# Patient Record
Sex: FEMALE | Race: WHITE | NOT HISPANIC OR LATINO | Employment: UNEMPLOYED | URBAN - METROPOLITAN AREA
[De-identification: names, ages, dates, MRNs, and addresses within clinical notes are randomized per-mention and may not be internally consistent; named-entity substitution may affect disease eponyms.]

---

## 2022-07-25 ENCOUNTER — OFFICE VISIT (OUTPATIENT)
Dept: NEUROSURGERY | Facility: CLINIC | Age: 52
End: 2022-07-25
Payer: COMMERCIAL

## 2022-07-25 VITALS
BODY MASS INDEX: 32.95 KG/M2 | DIASTOLIC BLOOD PRESSURE: 83 MMHG | RESPIRATION RATE: 18 BRPM | HEIGHT: 66 IN | WEIGHT: 205 LBS | HEART RATE: 63 BPM | SYSTOLIC BLOOD PRESSURE: 135 MMHG

## 2022-07-25 DIAGNOSIS — M48.02 CERVICAL SPINAL STENOSIS: Primary | ICD-10-CM

## 2022-07-25 PROCEDURE — 99204 OFFICE O/P NEW MOD 45 MIN: CPT | Mod: S$GLB,,, | Performed by: PHYSICIAN ASSISTANT

## 2022-07-25 PROCEDURE — 3008F BODY MASS INDEX DOCD: CPT | Mod: CPTII,S$GLB,, | Performed by: PHYSICIAN ASSISTANT

## 2022-07-25 PROCEDURE — 3079F PR MOST RECENT DIASTOLIC BLOOD PRESSURE 80-89 MM HG: ICD-10-PCS | Mod: CPTII,S$GLB,, | Performed by: PHYSICIAN ASSISTANT

## 2022-07-25 PROCEDURE — 1159F MED LIST DOCD IN RCRD: CPT | Mod: CPTII,S$GLB,, | Performed by: PHYSICIAN ASSISTANT

## 2022-07-25 PROCEDURE — 3075F PR MOST RECENT SYSTOLIC BLOOD PRESS GE 130-139MM HG: ICD-10-PCS | Mod: CPTII,S$GLB,, | Performed by: PHYSICIAN ASSISTANT

## 2022-07-25 PROCEDURE — 99204 PR OFFICE/OUTPT VISIT, NEW, LEVL IV, 45-59 MIN: ICD-10-PCS | Mod: S$GLB,,, | Performed by: PHYSICIAN ASSISTANT

## 2022-07-25 PROCEDURE — 3079F DIAST BP 80-89 MM HG: CPT | Mod: CPTII,S$GLB,, | Performed by: PHYSICIAN ASSISTANT

## 2022-07-25 PROCEDURE — 1159F PR MEDICATION LIST DOCUMENTED IN MEDICAL RECORD: ICD-10-PCS | Mod: CPTII,S$GLB,, | Performed by: PHYSICIAN ASSISTANT

## 2022-07-25 PROCEDURE — 3075F SYST BP GE 130 - 139MM HG: CPT | Mod: CPTII,S$GLB,, | Performed by: PHYSICIAN ASSISTANT

## 2022-07-25 PROCEDURE — 3008F PR BODY MASS INDEX (BMI) DOCUMENTED: ICD-10-PCS | Mod: CPTII,S$GLB,, | Performed by: PHYSICIAN ASSISTANT

## 2022-07-25 RX ORDER — GABAPENTIN 600 MG/1
TABLET ORAL
COMMUNITY

## 2022-07-25 RX ORDER — ERGOCALCIFEROL 1.25 MG/1
CAPSULE ORAL
COMMUNITY

## 2022-07-25 RX ORDER — HYDROCHLOROTHIAZIDE 12.5 MG/1
CAPSULE ORAL
COMMUNITY

## 2022-07-25 RX ORDER — ATORVASTATIN CALCIUM 10 MG/1
TABLET, FILM COATED ORAL
COMMUNITY

## 2022-07-25 RX ORDER — ALBUTEROL SULFATE 0.83 MG/ML
SOLUTION RESPIRATORY (INHALATION)
COMMUNITY

## 2022-07-25 RX ORDER — ASPIRIN 81 MG/1
81 TABLET ORAL DAILY
COMMUNITY

## 2022-07-25 RX ORDER — FLUTICASONE FUROATE AND VILANTEROL 200; 25 UG/1; UG/1
POWDER RESPIRATORY (INHALATION)
COMMUNITY

## 2022-07-25 RX ORDER — FAMOTIDINE 40 MG/1
TABLET, FILM COATED ORAL
COMMUNITY

## 2022-07-25 RX ORDER — BUPROPION HYDROCHLORIDE 150 MG/1
TABLET, EXTENDED RELEASE ORAL
COMMUNITY

## 2022-07-25 RX ORDER — MONTELUKAST SODIUM 10 MG/1
TABLET ORAL
COMMUNITY

## 2022-07-25 RX ORDER — PANTOPRAZOLE SODIUM 40 MG/1
40 TABLET, DELAYED RELEASE ORAL DAILY PRN
COMMUNITY

## 2022-07-25 RX ORDER — ONDANSETRON HYDROCHLORIDE 8 MG/1
TABLET, FILM COATED ORAL
COMMUNITY

## 2022-07-25 RX ORDER — ALPRAZOLAM 1 MG/1
TABLET ORAL
COMMUNITY

## 2022-07-25 RX ORDER — CARISOPRODOL 350 MG/1
TABLET ORAL
COMMUNITY
End: 2023-05-12

## 2022-07-25 RX ORDER — BUSPIRONE HYDROCHLORIDE 5 MG/1
TABLET ORAL
COMMUNITY

## 2022-07-25 RX ORDER — DICLOFENAC SODIUM 10 MG/G
GEL TOPICAL
COMMUNITY

## 2022-07-25 RX ORDER — NEBIVOLOL 5 MG/1
TABLET ORAL
COMMUNITY

## 2022-07-25 RX ORDER — SEMAGLUTIDE 1.34 MG/ML
1 INJECTION, SOLUTION SUBCUTANEOUS
Status: ON HOLD | COMMUNITY
Start: 2022-06-29 | End: 2023-04-06 | Stop reason: HOSPADM

## 2022-07-25 NOTE — PROGRESS NOTES
Neurosurgery History & Physical    Patient ID: Li Singh is a 51 y.o. female.    Chief Complaint   Patient presents with    Neck Pain     Patient presents to clinic with c/o neck pain that radiates into the upper back.  Patient states the first 3 fingers of the R hand are numb and the thumb of the L hand is numb.  Having headaches about 4-5 x per week.        Review of Systems   Constitutional: Negative for chills, diaphoresis, fatigue and fever.   HENT: Negative for congestion, ear pain, rhinorrhea, sneezing, sore throat and tinnitus.    Eyes: Negative for photophobia, pain, redness and visual disturbance.   Respiratory: Negative for cough, chest tightness, shortness of breath and wheezing.    Cardiovascular: Negative for chest pain, palpitations and leg swelling.   Gastrointestinal: Negative for abdominal distention, abdominal pain, constipation, diarrhea, nausea and vomiting.   Genitourinary: Negative for difficulty urinating, dysuria, frequency and urgency.   Musculoskeletal: Positive for neck pain. Negative for back pain, gait problem and myalgias.   Skin: Negative for pallor and rash.   Neurological: Positive for numbness. Negative for dizziness, seizures, speech difficulty, weakness and headaches.   Psychiatric/Behavioral: Negative for confusion and hallucinations.       History reviewed. No pertinent past medical history.  Social History     Socioeconomic History    Marital status:      Family History   Problem Relation Age of Onset    Breast cancer Mother 62     Review of patient's allergies indicates:  No Known Allergies    Current Outpatient Medications:     albuterol (PROVENTIL) 2.5 mg /3 mL (0.083 %) nebulizer solution, albuterol sulfate 2.5 mg/3 mL (0.083 %) solution for nebulization  INHALE 1 VIAL USING NEBULIZER EVERY 6 HOURS AS NEEDED FOR WHEEZING *THANK YOU* *TAVO MACK*, Disp: , Rfl:     ALPRAZolam (XANAX) 1 MG tablet, alprazolam 1 mg tablet  TAKE ONE TABLET BY MOUTH ONCE DAILY AS  NEEDED, Disp: , Rfl:     aspirin (ECOTRIN) 81 MG EC tablet, Take 81 mg by mouth once daily., Disp: , Rfl:     atorvastatin (LIPITOR) 10 MG tablet, atorvastatin 10 mg tablet  TAKE ONE TABLET BY MOUTH ONCE DAILY., Disp: , Rfl:     buPROPion (WELLBUTRIN SR) 150 MG TBSR 12 hr tablet, bupropion HCl  mg tablet,12 hr sustained-release  TAKE 1 TABLET BY MOUTH THREE TIMES DAILY *THANK YOU* *GOD RAVISS*, Disp: , Rfl:     busPIRone (BUSPAR) 5 MG Tab, buspirone 5 mg tablet  TAKE 1 TABLET BY MOUTH THREE TIMES DAILY AS NEEDED FOR ANXIETY, Disp: , Rfl:     carisoprodoL (SOMA) 350 MG tablet, carisoprodol 350 mg tablet  TAKE ONE TABLET BY MOUTH TWICE DAILY AS NEEDED FOR MUSCLE SPASMS, Disp: , Rfl:     diclofenac sodium (VOLTAREN) 1 % Gel, diclofenac 1 % topical gel  APPLY TO AFFECTED AREA TOPICALLY FOUR TIMES DAILY AS NEEDED THANK YOU GOD MARTINA, Disp: , Rfl:     ergocalciferol (ERGOCALCIFEROL) 50,000 unit Cap, Vitamin D2 1,250 mcg (50,000 unit) capsule  TAKE 1 CAPSULE BY MOUTH EVERY 7 DAYS *THANK YOU* *GOD RAVISS*, Disp: , Rfl:     famotidine (PEPCID) 40 MG tablet, famotidine 40 mg tablet, Disp: , Rfl:     fluticasone furoate-vilanteroL (BREO) 200-25 mcg/dose DsDv diskus inhaler, Breo Ellipta 200 mcg-25 mcg/dose powder for inhalation  inhale ONE PUFF BY MOUTH daily FOR breathing/asthma THANK YOU TAVO MACK, Disp: , Rfl:     gabapentin (NEURONTIN) 600 MG tablet, gabapentin 600 mg tablet  TAKE 1 TABLET BY MOUTH 3 TIMES DAILY, Disp: , Rfl:     hydroCHLOROthiazide (MICROZIDE) 12.5 mg capsule, hydrochlorothiazide 12.5 mg capsule  TAKE 1 CAPSULE BY MOUTH DAILY THANK YOU TAVO MACK, Disp: , Rfl:     montelukast (SINGULAIR) 10 mg tablet, montelukast 10 mg tablet  TAKE 1 TABLET BY MOUTH EVERY EVENING *THANK YOU* *GOD RAVISS*, Disp: , Rfl:     nebivoloL (BYSTOLIC) 5 MG Tab, nebivolol 5 mg tablet  TAKE 1 TABLET BY MOUTH DAILY FOR BLOOD PRESSURE *THANK YOU* *TAVO MACK*, Disp: , Rfl:     ondansetron (ZOFRAN) 8 MG tablet,  "ondansetron HCl 8 mg tablet  TAKE 1 TABLET BY MOUTH EVERY 8 HOURS AS NEEDED, Disp: , Rfl:     OZEMPIC 1 mg/dose (4 mg/3 mL), Inject 1 mg into the skin every 7 days., Disp: , Rfl:     pantoprazole (PROTONIX) 40 MG tablet, pantoprazole 40 mg tablet,delayed release  TAKE 1 TABLET BY MOUTH DAILY *THANK YOU* *TAVO MACK*, Disp: , Rfl:   Blood pressure 135/83, pulse 63, resp. rate 18, height 5' 6" (1.676 m), weight 93 kg (205 lb).      Neurologic Exam     Mental Status   Oriented to person, place, and time.   Oriented to person.   Oriented to place.   Oriented to time.   Follows 3 step commands.   Attention: normal. Concentration: normal.   Speech: speech is normal   Level of consciousness: alert  Knowledge: consistent with education.   Able to name object. Able to read. Able to repeat. Able to write. Normal comprehension.      Cranial Nerves      CN II   Visual acuity: normal  Right visual field deficit: none  Left visual field deficit: none      CN III, IV, VI   Pupils are equal, round, and reactive to light.  Right pupil: Size: 3 mm. Shape: regular. Reactivity: brisk. Consensual response: intact.   Left pupil: Size: 3 mm. Shape: regular. Reactivity: brisk. Consensual response: intact.   CN III: no CN III palsy  CN VI: no CN VI palsy  Nystagmus: none   Diplopia: none  Ophthalmoparesis: none  Conjugate gaze: present     CN V   Right facial sensation deficit: none  Left facial sensation deficit: none     CN VII   Right facial weakness: none  Left facial weakness: none     CN VIII   Hearing: intact     CN IX, X   CN IX normal.   CN X normal.      CN XI   Right sternocleidomastoid strength: normal  Left sternocleidomastoid strength: normal  Right trapezius strength: normal  Left trapezius strength: normal     CN XII   Fasciculations: absent  Tongue deviation: none     Motor Exam   Muscle bulk: normal  Overall muscle tone: normal  Right arm pronator drift: absent  Left arm pronator drift: absent     Strength   Right " deltoid: 5/5  Left deltoid: 5/5  Right biceps: 5/5  Left biceps: 5/5  Right triceps: 5/5  Left triceps: 5/5  Right wrist flexion: 5/5  Left wrist flexion: 5/5  Right wrist extension: 5/5  Left wrist extension: 5/5  Right interossei: 5/5  Left interossei: 5/5  Right iliopsoas: 5/5  Left iliopsoas: 5/5  Right quadriceps: 5/5  Left quadriceps: 5/5  Right hamstrin/5  Left hamstrin/5  Right anterior tibial: 5/5  Left anterior tibial: 5/5  Right posterior tibial: 5/5  Left posterior tibial: 5/5  Right peroneal: 5/5  Left peroneal: 5/5  Right gastroc: 5/5  Left gastroc: 5/5     Sensory Exam   Right arm light touch: normal  Left arm light touch: normal  Right leg light touch: normal  Left leg light touch: normal     Gait, Coordination, and Reflexes      Gait  Gait: normal      Coordination   Romberg: negative  Finger to nose coordination: normal  Heel to shin coordination: normal  Tandem walking coordination: normal     Tremor   Resting tremor: absent  Intention tremor: absent  Action tremor: absent     Reflexes   Right brachioradialis: 2+  Left brachioradialis: 2+  Right biceps: 2+  Left biceps: 2+  Right triceps: 2+  Left triceps: 2+  Right patellar: 2+  Left patellar: 2+  Right achilles: 1+  Left achilles: 1+  Right Lucio: present  Left Lucio: present  Right ankle clonus: absent  Left ankle clonus: absent  Right plantar: normal  Left plantar: normal    Physical Exam  Constitutional: Oriented to person, place, and time. Appears well-developed and well-nourished.   HENT:   Head: Normocephalic and atraumatic.   Eyes: Pupils are equal, round, and reactive to light.   Neck: Normal range of motion. Neck supple.   Cardiovascular: Normal rate.    Pulmonary/Chest: Effort normal.   Musculoskeletal: Normal range of motion. Exhibits no edema.   Neurological: Alert and oriented to person, place, and time. Normal Finger-Nose-Finger Test, a normal Heel to Shin Test, a normal Romberg Test and a normal Tandem Gait Test. Gait  normal.   Reflex Scores:       Tricep reflexes are 2+ on the right side and 2+ on the left side.       Bicep reflexes are 2+ on the right side and 2+ on the left side.       Brachioradialis reflexes are 2+ on the right side and 2+ on the left side.       Patellar reflexes are 2+ on the right side and 2+ on the left side.       Achilles reflexes are 1+ on the right side and 1+ on the left side.  Skin: Skin is warm, dry and intact.   Psychiatric: Normal mood and affect. Speech is normal and behavior is normal. Judgment and thought content normal.   Nursing note and vitals reviewed.    Provider dictation:  No updated imaging.     The patient is a 51 year old female who presents for neurosurgical evaluation. She reports a history of chronic neck pain for the past 10 years that has progressively worsened. The pain has become more severe over the past 2 years. It will radiate into the bilateral shoulder blades. Denies radiating upper extremity pain. Reports intermittent numbness in mainly the 1st-3rd digits of the right > left hand that occurs at night usually when her neck is positioned in right or left sided flexion. She underwent injections in her cervical spine and participated in physical therapy about 4 years ago with no relief. She has been seeing a chiropractor who she last saw about 3 months ago with short term relief of her symptoms. She has been evaluated by Dr. Al about 4 years ago who was recommending surgery at that time according to the patient. Denies upper extremity weakness or dropping objects from her hands. Denies difficulty with fine motor movements or balance difficulty.     On exam the patient has full strength. There is + bilateral villalba signs.     I will obtain an MRI of the Cervical spine to assess for compressive pathology. I will call the patient with the results and further plan.     1. Cervical spinal stenosis  MRI Cervical Spine Without Contrast

## 2022-07-27 ENCOUNTER — HOSPITAL ENCOUNTER (OUTPATIENT)
Dept: RADIOLOGY | Facility: HOSPITAL | Age: 52
Discharge: HOME | End: 2022-07-27
Attending: PHYSICIAN ASSISTANT
Payer: COMMERCIAL

## 2022-07-27 DIAGNOSIS — M48.02 CERVICAL SPINAL STENOSIS: ICD-10-CM

## 2022-07-27 PROCEDURE — 72141 MRI NECK SPINE W/O DYE: CPT | Mod: TC,PO

## 2022-07-27 PROCEDURE — 72141 MRI CERVICAL SPINE WITHOUT CONTRAST: ICD-10-PCS | Mod: 26,,, | Performed by: RADIOLOGY

## 2022-07-27 PROCEDURE — 72141 MRI NECK SPINE W/O DYE: CPT | Mod: 26,,, | Performed by: RADIOLOGY

## 2022-08-02 ENCOUNTER — TELEPHONE (OUTPATIENT)
Dept: NEUROSURGERY | Facility: CLINIC | Age: 52
End: 2022-08-02
Payer: COMMERCIAL

## 2022-08-02 DIAGNOSIS — M47.812 CERVICAL SPONDYLOSIS: Primary | ICD-10-CM

## 2022-08-02 NOTE — TELEPHONE ENCOUNTER
Called patient and reviewed results of MRI. Recommend PT and Pain management referral. Order placed for Pain management. Patient will call with name of facility she would like PT orders sent to. FU PRN.       ----- Message from Shelby Segura LPN sent at 7/25/2022  2:11 PM CDT -----  Please call with results of MRI

## 2022-08-02 NOTE — TELEPHONE ENCOUNTER
----- Message from Shelby Segura LPN sent at 7/25/2022  2:11 PM CDT -----  Please call with results of MRI

## 2022-08-04 ENCOUNTER — TELEPHONE (OUTPATIENT)
Dept: PAIN MEDICINE | Facility: CLINIC | Age: 52
End: 2022-08-04
Payer: COMMERCIAL

## 2022-08-04 NOTE — TELEPHONE ENCOUNTER
Call placed to Pt to assist with scheduling appt from referral by ERROL Horton.. Appt scheduled for 9-7-22.

## 2022-08-04 NOTE — TELEPHONE ENCOUNTER
----- Message from Miles Brooks MA sent at 8/3/2022  8:00 AM CDT -----  Good Morning,      ERROL Steen has put in a referral for the current patient, the patient is actually closer to you then here in [place].    Thank you,    Miles

## 2022-08-31 ENCOUNTER — CLINICAL SUPPORT (OUTPATIENT)
Dept: OPHTHALMOLOGY | Facility: CLINIC | Age: 52
End: 2022-08-31
Payer: COMMERCIAL

## 2022-08-31 ENCOUNTER — OFFICE VISIT (OUTPATIENT)
Dept: OPHTHALMOLOGY | Facility: CLINIC | Age: 52
End: 2022-08-31
Payer: COMMERCIAL

## 2022-08-31 DIAGNOSIS — H47.333 PSEUDOPAPILLEDEMA OF BOTH OPTIC DISCS: ICD-10-CM

## 2022-08-31 DIAGNOSIS — H53.40 VISUAL FIELD DEFECT: Primary | ICD-10-CM

## 2022-08-31 DIAGNOSIS — H47.11 PAPILLEDEMA ASSOCIATED WITH INCREASED INTRACRANIAL PRESSURE: Primary | ICD-10-CM

## 2022-08-31 DIAGNOSIS — H47.333 CROWDED OPTIC DISC, BILATERAL: ICD-10-CM

## 2022-08-31 PROCEDURE — 99999 PR PBB SHADOW E&M-EST. PATIENT-LVL III: ICD-10-PCS | Mod: PBBFAC,,, | Performed by: OPHTHALMOLOGY

## 2022-08-31 PROCEDURE — 92133 CPTRZD OPH DX IMG PST SGM ON: CPT | Mod: S$GLB,,, | Performed by: OPHTHALMOLOGY

## 2022-08-31 PROCEDURE — 1159F MED LIST DOCD IN RCRD: CPT | Mod: CPTII,S$GLB,, | Performed by: OPHTHALMOLOGY

## 2022-08-31 PROCEDURE — 92133 POSTERIOR SEGMENT OCT OPTIC NERVE(OCULAR COHERENCE TOMOGRAPHY) - OU - BOTH EYES: ICD-10-PCS | Mod: S$GLB,,, | Performed by: OPHTHALMOLOGY

## 2022-08-31 PROCEDURE — 99203 OFFICE O/P NEW LOW 30 MIN: CPT | Mod: S$GLB,,, | Performed by: OPHTHALMOLOGY

## 2022-08-31 PROCEDURE — 99203 PR OFFICE/OUTPT VISIT, NEW, LEVL III, 30-44 MIN: ICD-10-PCS | Mod: S$GLB,,, | Performed by: OPHTHALMOLOGY

## 2022-08-31 PROCEDURE — 92083 EXTENDED VISUAL FIELD XM: CPT | Mod: S$GLB,,, | Performed by: OPHTHALMOLOGY

## 2022-08-31 PROCEDURE — 99999 PR PBB SHADOW E&M-EST. PATIENT-LVL III: CPT | Mod: PBBFAC,,, | Performed by: OPHTHALMOLOGY

## 2022-08-31 PROCEDURE — 1159F PR MEDICATION LIST DOCUMENTED IN MEDICAL RECORD: ICD-10-PCS | Mod: CPTII,S$GLB,, | Performed by: OPHTHALMOLOGY

## 2022-08-31 PROCEDURE — 92083 HUMPHREY VISUAL FIELD - OU - BOTH EYES: ICD-10-PCS | Mod: S$GLB,,, | Performed by: OPHTHALMOLOGY

## 2022-08-31 NOTE — LETTER
[facility] - [address]  [address]  [facility] [address]  Phone: [phone]  Fax: [phone]   August 31, 2022    Robert Mcdaniel, OD  [address] [facility] [place] [address]    Patient: Li Singh   MR Number: [id number]   YOB: 1970   Date of Visit: 8/31/2022       Dear Dr. Mcdaniel:    Thank you for referring Li Singh to me for evaluation. Here is my assessment and plan of care:    Assessment/Plan    For exam results, see Encounter Report.    Pseudopapilledema of both optic discs  -     Kamara Visual Field - OU - Extended - Both Eyes    Crowded optic disc, bilateral  -     Posterior Segment OCT Optic Nerve- Both eyes      I found no evidence of optic disc edema in either. She has a congenital anomaly. No further diagnostic testing is indicated. Return to me as needed.          Below you will find my full exam findings. If you have questions, please do not hesitate to call me. I look forward to following Ms. Li Singh along with you.    Sincerely,          Marco Rendon MD       CC  No Recipients             Base Eye Exam       Visual Acuity (Snellen - Linear)         Right Left    Dist cc 20/20 20/20      Correction: Glasses              Tonometry (Applanation, 3:42 PM)         Right Left    Pressure 17 15              Pupils         Dark Light Shape React APD    Right 4 2 Round Brisk None    Left 4 2 Round Brisk None              Visual Fields    See HVF report             Extraocular Movement         Right Left     Full, Ortho Full, Ortho              Neuro/Psych       Oriented x3: Yes    Mood/Affect: Normal              Dilation       Both eyes: 1% Mydriacyl, 2.5% Phenylephrine @ 3:44 PM                  Slit Lamp and Fundus Exam       External Exam         Right Left    External Normal Normal              Slit Lamp Exam         Right Left    Lids/Lashes Normal Normal    Conjunctiva/Sclera White and quiet White and quiet    Cornea Clear Clear    Anterior  Chamber Deep and quiet Deep and quiet    Iris Round and reactive Round and reactive    Lens Clear Clear    Vitreous Normal Normal              Fundus Exam         Right Left    Disc Crowded, no edema Crowded, no edema    C/D Ratio 0.1 0.1    Macula Normal Normal    Vessels Normal Normal    Periphery Normal Normal

## 2022-08-31 NOTE — PROGRESS NOTES
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  Headaches regularly.(Temple and eye area)  Vision stable w/correction, but does notice haze on occasion.  No eye drops.    Review HVF AND OCT(RNFL)    I have personally interviewed the patient, reviewed the history and   examined the patient and agree with the technician's exam.   Last edited by Marco Rendon MD on 8/31/2022  3:28 PM.            Assessment /Plan     For exam results, see Encounter Report.    Pseudopapilledema of both optic discs  -     Kamara Visual Field - OU - Extended - Both Eyes    Crowded optic disc, bilateral  -     Posterior Segment OCT Optic Nerve- Both eyes      I found no evidence of optic disc edema in either. She has a congenital anomaly. No further diagnostic testing is indicated. Return to me as needed.

## 2022-09-07 ENCOUNTER — TELEPHONE (OUTPATIENT)
Dept: PAIN MEDICINE | Facility: CLINIC | Age: 52
End: 2022-09-07
Payer: COMMERCIAL

## 2022-09-07 ENCOUNTER — OFFICE VISIT (OUTPATIENT)
Dept: PAIN MEDICINE | Facility: CLINIC | Age: 52
End: 2022-09-07
Payer: COMMERCIAL

## 2022-09-07 VITALS
SYSTOLIC BLOOD PRESSURE: 128 MMHG | OXYGEN SATURATION: 100 % | DIASTOLIC BLOOD PRESSURE: 68 MMHG | HEIGHT: 66 IN | HEART RATE: 72 BPM | WEIGHT: 204.25 LBS | BODY MASS INDEX: 32.83 KG/M2

## 2022-09-07 DIAGNOSIS — M54.12 CERVICAL RADICULOPATHY: Primary | ICD-10-CM

## 2022-09-07 DIAGNOSIS — M47.812 CERVICAL SPONDYLOSIS: ICD-10-CM

## 2022-09-07 PROCEDURE — 3074F PR MOST RECENT SYSTOLIC BLOOD PRESSURE < 130 MM HG: ICD-10-PCS | Mod: CPTII,S$GLB,, | Performed by: ANESTHESIOLOGY

## 2022-09-07 PROCEDURE — 99999 PR PBB SHADOW E&M-EST. PATIENT-LVL V: ICD-10-PCS | Mod: PBBFAC,,, | Performed by: ANESTHESIOLOGY

## 2022-09-07 PROCEDURE — 99999 PR PBB SHADOW E&M-EST. PATIENT-LVL V: CPT | Mod: PBBFAC,,, | Performed by: ANESTHESIOLOGY

## 2022-09-07 PROCEDURE — 99204 OFFICE O/P NEW MOD 45 MIN: CPT | Mod: S$GLB,,, | Performed by: ANESTHESIOLOGY

## 2022-09-07 PROCEDURE — 3008F PR BODY MASS INDEX (BMI) DOCUMENTED: ICD-10-PCS | Mod: CPTII,S$GLB,, | Performed by: ANESTHESIOLOGY

## 2022-09-07 PROCEDURE — 99204 PR OFFICE/OUTPT VISIT, NEW, LEVL IV, 45-59 MIN: ICD-10-PCS | Mod: S$GLB,,, | Performed by: ANESTHESIOLOGY

## 2022-09-07 PROCEDURE — 3074F SYST BP LT 130 MM HG: CPT | Mod: CPTII,S$GLB,, | Performed by: ANESTHESIOLOGY

## 2022-09-07 PROCEDURE — 3008F BODY MASS INDEX DOCD: CPT | Mod: CPTII,S$GLB,, | Performed by: ANESTHESIOLOGY

## 2022-09-07 PROCEDURE — 1159F PR MEDICATION LIST DOCUMENTED IN MEDICAL RECORD: ICD-10-PCS | Mod: CPTII,S$GLB,, | Performed by: ANESTHESIOLOGY

## 2022-09-07 PROCEDURE — 3078F DIAST BP <80 MM HG: CPT | Mod: CPTII,S$GLB,, | Performed by: ANESTHESIOLOGY

## 2022-09-07 PROCEDURE — 3078F PR MOST RECENT DIASTOLIC BLOOD PRESSURE < 80 MM HG: ICD-10-PCS | Mod: CPTII,S$GLB,, | Performed by: ANESTHESIOLOGY

## 2022-09-07 PROCEDURE — 1159F MED LIST DOCD IN RCRD: CPT | Mod: CPTII,S$GLB,, | Performed by: ANESTHESIOLOGY

## 2022-09-07 RX ORDER — SODIUM CHLORIDE, SODIUM LACTATE, POTASSIUM CHLORIDE, CALCIUM CHLORIDE 600; 310; 30; 20 MG/100ML; MG/100ML; MG/100ML; MG/100ML
INJECTION, SOLUTION INTRAVENOUS CONTINUOUS
Status: CANCELLED | OUTPATIENT
Start: 2022-09-07

## 2022-09-07 RX ORDER — TRAZODONE HYDROCHLORIDE 50 MG/1
TABLET ORAL
COMMUNITY
End: 2022-09-21

## 2022-09-07 NOTE — TELEPHONE ENCOUNTER
This patient is scheduled to have a cervical epidural steroid injection on 9/22 and will need to hold her aspirin for 5 days prior.

## 2022-09-07 NOTE — H&P (VIEW-ONLY)
This note was completed with dictation software and grammatical errors may exist.    Chief Complaint   Patient presents with    Neck Pain        HPI: Li Singh is a 51 y.o. year old female patient who has a past medical history of Anxiety, Gastric ulcer, Hyperlipidemia, and Hypertension. She presents in referral from Shikha Steen for neck pain.  The patient reports that she has had neck pain for about the last 10 years, denies any specific trauma that may have caused this.  Is located in her bilateral neck at the base of her neck out into the bilateral  numbness and tingling in the right arm especially when tilting her head to the left and gets numbness ,  and tingling in the 1st through 3rd fingers.  The pain also radiates into the trapezius sometimes into the clavicle and down the middle of her spine through the shoulder blades.   She reports that at time she gets headaches in the occipital region that advanced into the front of her head at times as well.   She denies any balance issues, denies any regan weakness.    Pain intervention history:  She apparently has undergone injections in her neck with no relief.    Spine surgeries:  The patient states that she had seen Dr. Al in the past who had recommended surgery    Antineuropathics:  Gabapentin 600 3 times daily  NSAIDs:  Physical therapy:  She is done physical therapy in the past, chiropractic care with some benefit.  She states that she was seeing a chiropractor at least once a week but was not able to do this for 3 months and states that her pain greatly worsened during that time.  Antidepressants:  BuSpar 5 mg, Wellbutrin 150 mg  Muscle relaxers:  Xanax 1 mg, Soma  Opioids:  Antiplatelets/Anticoagulants:  Aspirin 81        ROS:  She reports headaches, stomach ulcer, easy bruising, joint stiffness, back pain, memory loss, dizziness, difficulty sleeping.  Balance of review of systems negative.    No results found for: LABA1C, HGBA1C    No results  "found for: WBC, HGB, HCT, MCV, PLT          Past Medical History:   Diagnosis Date    Anxiety     Gastric ulcer     Hyperlipidemia     Hypertension        History reviewed. No pertinent surgical history.    Social History     Socioeconomic History    Marital status:          Medications/Allergies: See med card    Vitals:    22 0926   BP: 128/68   Pulse: 72   SpO2: 100%   Weight: 92.7 kg (204 lb 4.1 oz)   Height: 5' 6" (1.676 m)   PainSc:   8   PainLoc: Neck     Body mass index is 32.97 kg/m².    Physical exam:  Gen: A and O x3, pleasant, well-groomed  Skin: No rashes or obvious lesions  HEENT: PERRLA, no obvious deformities on ears or in canals.Trachea midline.  CVS: Regular rate and rhythm, normal palpable pulses.  Resp: Clear to auscultation bilaterally, no wheezes or rales.  Abdomen: Soft, NT/ND.  Musculoskeletal: No antalgic gait.       Neuro:  Motor:    Right Left   C4 Shoulder Abduction  5  5   C5 Elbow Flexion    5  5   C6 Wrist Extension  5  5   C7 Elbow Extension   5  5   C8/T1 Hand Intrinsics   5  5   C8 First Dorsal Interosseus  5  5   C8 Abductor Pollicus Brevis  5  5      Left  Right    Triceps DTR 2+ 2+   Biceps DTR 2+ 2+   Brachioradialis DTR 2+ 2+   Patellar DTR 1 1+   Achilles DTR 1+ 1+   Lucio Absent  Absent   Clonus Absent Absent            Sensory: Intact and symmetrical to light touch and pinprick in C2-T1 dermatomes bilaterally.  Cervical spine: ROM is full in flexion, extension and lateral rotation with increased pain on extension greater than flexion, lateral rotation increased pain to the left greater than right   Spurling's maneuver causes neck pain to either side, left greater than right..  Myofascial exam: No Tenderness to palpation across cervical paraspinous region bilaterally.    Imagin22 MRI C-spine:  Alignment: Reversal of the normal cervical lordosis centered at C4.  Minimal anterolisthesis of C3 on C4, 2 mm.  Minimal retrolisthesis of C4 on C5 and C5 on C6, " 1-2 mm.   Vertebral Column: Vertebral body heights are maintained.  No evidence of an acute fracture or aggressive marrow replacement process. Probable small hemangiomas within the C4 C7 vertebral bodies.  Moderate disc degeneration at C4-5 and C5-6 with moderate intervertebral disc space narrowing, degenerative endplate change and marginal osteophyte formation.   Cord: Mild flattening of the left ventral aspect of the cord at C4-5.  No focal cord signal abnormality.   Skull base and craniocervical junction: Normal.   Degenerative findings:   C2-C3: The disc is normal in configuration. Mild left facet arthropathy.  There is no neural foraminal stenosis.  There is no spinal canal stenosis.   C3-C4: Mild posterior disc osteophyte complex, which mildly effaces the ventral thecal sac.  Mild left facet arthropathy.  Mild left neural foraminal stenosis.  There is no spinal canal stenosis.   C4-C5: Left asymmetric posterior disc osteophyte complex, which mildly effaces the ventral thecal sac and flattens the left ventral aspect of the cord without significant cord compression or focal cord signal abnormality.  Mild bilateral facet arthropathy.  Moderate left uncovertebral joint spurring.  Moderate left neural foraminal stenosis.  There is no spinal canal stenosis.   C5-C6: Posterior disc osteophyte complex.  Mild bilateral facet arthropathy.  Marked right and moderate left uncovertebral joint spurring.  Severe right and moderate left neural foraminal stenosis.  Ligamentum flavum thickening.  Mild spinal canal stenosis.   C6-C7: Mild posterior disc osteophyte complex.  Mild bilateral facet arthropathy.  Mild left uncovertebral joint spurring.  Mild left neural foraminal stenosis.  There is no spinal canal stenosis.   C7-T1: The disc is normal in configuration.  Mild bilateral facet arthropathy and uncovertebral joint spurring.  Mild bilateral neural foraminal stenosis.  There is no spinal canal stenosis.    Assessment:    Li Singh is a 51 y.o. year old female patient who has a past medical history of Anxiety, Gastric ulcer, Hyperlipidemia, and Hypertension. She presents in referral from Shikha Steen for neck pain.     1. Cervical radiculopathy  Vital signs    Place 18-22 [facility] IV     Verify informed consent    Notify physician     Notify physician     Notify physician (specify)    Diet NPO    Case Request Operating Room: Injection-steroid-epidural-cervical    Place in Outpatient    lactated ringers infusion      2. Cervical spondylosis            Plan:  1. We discussed her symptoms, reviewed her cervical spine MRI which shows reversal of cervical lordosis with degenerative changes at C4/5 and C5/6.  There is not necessarily compression of the cord but there is definitely indentation towards the left side into the thecal sac at C4/5 and some foraminal narrowing out to the right side at C5/6 which may account for her right arm and hand numbness and tingling.  We discussed that she has some facet arthropathy at multiple levels throughout her cervical spine which could also contribute to the neck pain but also her headaches.  2. We discussed interventional procedures that may help, I suggested we start with an epidural steroid injection to see if this helps with both the neck and arm symptoms.  I will have her follow up in several weeks afterwards.  If she is having relief, we can continue treatments as needed.  If she does not have relief with the epidural steroid injection I would consider medial branch blocks.  3.  She asked about what type of medication she could take, I told her to continue taking Tylenol, she reports taking Soma from her primary care doctor in the past but states that she takes this rarely and usually mixes it with a Percocet when she does, this is 1 of her family members medications.  Nonetheless she does not sound like she is seeking this type of medication, I told her to be careful with  this as it can cause addiction and overdose.  She reports having a sister who had substance abuse problems.    Thank you for referring this interesting patient, and I look forward to continuing to collaborate in her care.

## 2022-09-07 NOTE — PROGRESS NOTES
This note was completed with dictation software and grammatical errors may exist.    Chief Complaint   Patient presents with    Neck Pain        HPI: Li Singh is a 51 y.o. year old female patient who has a past medical history of Anxiety, Gastric ulcer, Hyperlipidemia, and Hypertension. She presents in referral from Shikha Steen for neck pain.  The patient reports that she has had neck pain for about the last 10 years, denies any specific trauma that may have caused this.  Is located in her bilateral neck at the base of her neck out into the bilateral  numbness and tingling in the right arm especially when tilting her head to the left and gets numbness ,  and tingling in the 1st through 3rd fingers.  The pain also radiates into the trapezius sometimes into the clavicle and down the middle of her spine through the shoulder blades.   She reports that at time she gets headaches in the occipital region that advanced into the front of her head at times as well.   She denies any balance issues, denies any regan weakness.    Pain intervention history:  She apparently has undergone injections in her neck with no relief.    Spine surgeries:  The patient states that she had seen Dr. Al in the past who had recommended surgery    Antineuropathics:  Gabapentin 600 3 times daily  NSAIDs:  Physical therapy:  She is done physical therapy in the past, chiropractic care with some benefit.  She states that she was seeing a chiropractor at least once a week but was not able to do this for 3 months and states that her pain greatly worsened during that time.  Antidepressants:  BuSpar 5 mg, Wellbutrin 150 mg  Muscle relaxers:  Xanax 1 mg, Soma  Opioids:  Antiplatelets/Anticoagulants:  Aspirin 81        ROS:  She reports headaches, stomach ulcer, easy bruising, joint stiffness, back pain, memory loss, dizziness, difficulty sleeping.  Balance of review of systems negative.    No results found for: LABA1C, HGBA1C    No results  "found for: WBC, HGB, HCT, MCV, PLT          Past Medical History:   Diagnosis Date    Anxiety     Gastric ulcer     Hyperlipidemia     Hypertension        History reviewed. No pertinent surgical history.    Social History     Socioeconomic History    Marital status:          Medications/Allergies: See med card    Vitals:    22 0926   BP: 128/68   Pulse: 72   SpO2: 100%   Weight: 92.7 kg (204 lb 4.1 oz)   Height: 5' 6" (1.676 m)   PainSc:   8   PainLoc: Neck     Body mass index is 32.97 kg/m².    Physical exam:  Gen: A and O x3, pleasant, well-groomed  Skin: No rashes or obvious lesions  HEENT: PERRLA, no obvious deformities on ears or in canals.Trachea midline.  CVS: Regular rate and rhythm, normal palpable pulses.  Resp: Clear to auscultation bilaterally, no wheezes or rales.  Abdomen: Soft, NT/ND.  Musculoskeletal: No antalgic gait.       Neuro:  Motor:    Right Left   C4 Shoulder Abduction  5  5   C5 Elbow Flexion    5  5   C6 Wrist Extension  5  5   C7 Elbow Extension   5  5   C8/T1 Hand Intrinsics   5  5   C8 First Dorsal Interosseus  5  5   C8 Abductor Pollicus Brevis  5  5      Left  Right    Triceps DTR 2+ 2+   Biceps DTR 2+ 2+   Brachioradialis DTR 2+ 2+   Patellar DTR 1 1+   Achilles DTR 1+ 1+   Lucio Absent  Absent   Clonus Absent Absent            Sensory: Intact and symmetrical to light touch and pinprick in C2-T1 dermatomes bilaterally.  Cervical spine: ROM is full in flexion, extension and lateral rotation with increased pain on extension greater than flexion, lateral rotation increased pain to the left greater than right   Spurling's maneuver causes neck pain to either side, left greater than right..  Myofascial exam: No Tenderness to palpation across cervical paraspinous region bilaterally.    Imagin22 MRI C-spine:  Alignment: Reversal of the normal cervical lordosis centered at C4.  Minimal anterolisthesis of C3 on C4, 2 mm.  Minimal retrolisthesis of C4 on C5 and C5 on C6, " 1-2 mm.   Vertebral Column: Vertebral body heights are maintained.  No evidence of an acute fracture or aggressive marrow replacement process. Probable small hemangiomas within the C4 C7 vertebral bodies.  Moderate disc degeneration at C4-5 and C5-6 with moderate intervertebral disc space narrowing, degenerative endplate change and marginal osteophyte formation.   Cord: Mild flattening of the left ventral aspect of the cord at C4-5.  No focal cord signal abnormality.   Skull base and craniocervical junction: Normal.   Degenerative findings:   C2-C3: The disc is normal in configuration. Mild left facet arthropathy.  There is no neural foraminal stenosis.  There is no spinal canal stenosis.   C3-C4: Mild posterior disc osteophyte complex, which mildly effaces the ventral thecal sac.  Mild left facet arthropathy.  Mild left neural foraminal stenosis.  There is no spinal canal stenosis.   C4-C5: Left asymmetric posterior disc osteophyte complex, which mildly effaces the ventral thecal sac and flattens the left ventral aspect of the cord without significant cord compression or focal cord signal abnormality.  Mild bilateral facet arthropathy.  Moderate left uncovertebral joint spurring.  Moderate left neural foraminal stenosis.  There is no spinal canal stenosis.   C5-C6: Posterior disc osteophyte complex.  Mild bilateral facet arthropathy.  Marked right and moderate left uncovertebral joint spurring.  Severe right and moderate left neural foraminal stenosis.  Ligamentum flavum thickening.  Mild spinal canal stenosis.   C6-C7: Mild posterior disc osteophyte complex.  Mild bilateral facet arthropathy.  Mild left uncovertebral joint spurring.  Mild left neural foraminal stenosis.  There is no spinal canal stenosis.   C7-T1: The disc is normal in configuration.  Mild bilateral facet arthropathy and uncovertebral joint spurring.  Mild bilateral neural foraminal stenosis.  There is no spinal canal stenosis.    Assessment:    Li Singh is a 51 y.o. year old female patient who has a past medical history of Anxiety, Gastric ulcer, Hyperlipidemia, and Hypertension. She presents in referral from Shikha Steen for neck pain.     1. Cervical radiculopathy  Vital signs    Place 18-22 [facility] IV     Verify informed consent    Notify physician     Notify physician     Notify physician (specify)    Diet NPO    Case Request Operating Room: Injection-steroid-epidural-cervical    Place in Outpatient    lactated ringers infusion      2. Cervical spondylosis            Plan:  1. We discussed her symptoms, reviewed her cervical spine MRI which shows reversal of cervical lordosis with degenerative changes at C4/5 and C5/6.  There is not necessarily compression of the cord but there is definitely indentation towards the left side into the thecal sac at C4/5 and some foraminal narrowing out to the right side at C5/6 which may account for her right arm and hand numbness and tingling.  We discussed that she has some facet arthropathy at multiple levels throughout her cervical spine which could also contribute to the neck pain but also her headaches.  2. We discussed interventional procedures that may help, I suggested we start with an epidural steroid injection to see if this helps with both the neck and arm symptoms.  I will have her follow up in several weeks afterwards.  If she is having relief, we can continue treatments as needed.  If she does not have relief with the epidural steroid injection I would consider medial branch blocks.  3.  She asked about what type of medication she could take, I told her to continue taking Tylenol, she reports taking Soma from her primary care doctor in the past but states that she takes this rarely and usually mixes it with a Percocet when she does, this is 1 of her family members medications.  Nonetheless she does not sound like she is seeking this type of medication, I told her to be careful with  this as it can cause addiction and overdose.  She reports having a sister who had substance abuse problems.    Thank you for referring this interesting patient, and I look forward to continuing to collaborate in her care.

## 2022-09-20 ENCOUNTER — TELEPHONE (OUTPATIENT)
Dept: SURGERY | Facility: HOSPITAL | Age: 52
End: 2022-09-20
Payer: COMMERCIAL

## 2022-09-20 NOTE — TELEPHONE ENCOUNTER
Spoke with pt - she denies taking ASA yesterday. She states that her last dose was last week on either 9/15 or 9/16. She is scheduled for a MODESTA on 9/22. If she had taken her last dose of ASA on 9/16, she will be off x6 days for her procedure. Ok to continue or should she reschedule?

## 2022-09-20 NOTE — TELEPHONE ENCOUNTER
Hi, during the preop phone call, this patient reports that she forgot to hold her aspirin, stating that she was instructed to hold for 7 days and her last dose was 09/19/22. Her procedure with Dr. Bernabe was scheduled for 09/22/22. Please reach out to patient directly to advise, reschedule, cancel, etc.

## 2022-09-22 ENCOUNTER — HOSPITAL ENCOUNTER (OUTPATIENT)
Dept: RADIOLOGY | Facility: HOSPITAL | Age: 52
Discharge: HOME | End: 2022-09-22
Attending: ANESTHESIOLOGY | Admitting: ANESTHESIOLOGY
Payer: COMMERCIAL

## 2022-09-22 ENCOUNTER — HOSPITAL ENCOUNTER (OUTPATIENT)
Facility: HOSPITAL | Age: 52
Discharge: HOME | End: 2022-09-22
Attending: ANESTHESIOLOGY | Admitting: ANESTHESIOLOGY
Payer: COMMERCIAL

## 2022-09-22 DIAGNOSIS — M50.30 DDD (DEGENERATIVE DISC DISEASE), CERVICAL: ICD-10-CM

## 2022-09-22 DIAGNOSIS — M54.12 CERVICAL RADICULOPATHY: ICD-10-CM

## 2022-09-22 PROCEDURE — A4216 STERILE WATER/SALINE, 10 ML: HCPCS | Mod: PO | Performed by: ANESTHESIOLOGY

## 2022-09-22 PROCEDURE — 25000003 PHARM REV CODE 250: Mod: PO | Performed by: ANESTHESIOLOGY

## 2022-09-22 PROCEDURE — 62321 NJX INTERLAMINAR CRV/THRC: CPT | Mod: ,,, | Performed by: ANESTHESIOLOGY

## 2022-09-22 PROCEDURE — 25500020 PHARM REV CODE 255: Mod: PO | Performed by: ANESTHESIOLOGY

## 2022-09-22 PROCEDURE — 76000 FLUOROSCOPY <1 HR PHYS/QHP: CPT | Mod: TC,PO

## 2022-09-22 PROCEDURE — 62321 PR INJ CERV/THORAC, W/GUIDANCE: ICD-10-PCS | Mod: ,,, | Performed by: ANESTHESIOLOGY

## 2022-09-22 PROCEDURE — 62321 NJX INTERLAMINAR CRV/THRC: CPT | Mod: PO | Performed by: ANESTHESIOLOGY

## 2022-09-22 PROCEDURE — 63600175 PHARM REV CODE 636 W HCPCS: Mod: PO | Performed by: ANESTHESIOLOGY

## 2022-09-22 RX ORDER — SODIUM CHLORIDE 9 MG/ML
INJECTION, SOLUTION INTRAMUSCULAR; INTRAVENOUS; SUBCUTANEOUS
Status: DISCONTINUED | OUTPATIENT
Start: 2022-09-22 | End: 2022-09-22 | Stop reason: HOSPADM

## 2022-09-22 RX ORDER — LIDOCAINE HYDROCHLORIDE 10 MG/ML
INJECTION, SOLUTION EPIDURAL; INFILTRATION; INTRACAUDAL; PERINEURAL
Status: DISCONTINUED | OUTPATIENT
Start: 2022-09-22 | End: 2022-09-22 | Stop reason: HOSPADM

## 2022-09-22 RX ORDER — SODIUM CHLORIDE, SODIUM LACTATE, POTASSIUM CHLORIDE, CALCIUM CHLORIDE 600; 310; 30; 20 MG/100ML; MG/100ML; MG/100ML; MG/100ML
INJECTION, SOLUTION INTRAVENOUS CONTINUOUS
Status: DISCONTINUED | OUTPATIENT
Start: 2022-09-22 | End: 2022-09-22 | Stop reason: HOSPADM

## 2022-09-22 RX ORDER — METHYLPREDNISOLONE ACETATE 80 MG/ML
INJECTION, SUSPENSION INTRA-ARTICULAR; INTRALESIONAL; INTRAMUSCULAR; SOFT TISSUE
Status: DISCONTINUED | OUTPATIENT
Start: 2022-09-22 | End: 2022-09-22 | Stop reason: HOSPADM

## 2022-09-22 RX ORDER — MIDAZOLAM HYDROCHLORIDE 1 MG/ML
INJECTION INTRAMUSCULAR; INTRAVENOUS
Status: DISCONTINUED | OUTPATIENT
Start: 2022-09-22 | End: 2022-09-22 | Stop reason: HOSPADM

## 2022-09-22 RX ADMIN — SODIUM CHLORIDE, SODIUM LACTATE, POTASSIUM CHLORIDE, AND CALCIUM CHLORIDE: .6; .31; .03; .02 INJECTION, SOLUTION INTRAVENOUS at 03:09

## 2022-09-22 NOTE — OP NOTE
PROCEDURE DATE: 9/22/2022    Procedure: C7-T1 cervical interlaminar epidural steroid injection under utilizing fluoroscopy.    Diagnosis: Cervical Radiculopathy    POSTOP DIAGNOSIS: SAME    Physician: Ronald Bernabe MD    Medications injected:  Methylprednisone 80mg followed by a slow injection of 4 mL sterile, preservative-free normal saline.    Local anesthetic used: Lidocaine 1%, 4 ml.    Sedation Medications: 2mg versed    Complications:  none    Estimated blood loss: none    Technique:  A time-out was taken to identify patient and procedure prior to starting the procedure.  With the patient laying in a prone position with the neck in a mid-flexed forward position, the area was prepped and draped in the usual sterile fashion using ChloraPrep and a fenestrated drape.  The area was determined under AP fluoroscopic guidance.  Local anesthetic was given using a 25-gauge 1.5 inch needle by raising a wheal and then infiltrating ventrally.  A 3.5 inch 20-gauge Touhy needle was introduced under fluoroscopic guidance to meet the lamina of C7.  The needle was then hinged under the lamina then advanced using loss of resistance technique.  Once the tip of the needle was in the desired position, the contrast dye Omnipaque was injected to determine placement and no uptake.  The steroid was then injected slowly followed by a slow injection of 4 mL of the sterile preservative-free normal saline.  The patient tolerated the procedure well.    The patient was monitored after the procedure and was given post-procedure and discharge instructions to follow at home. The patient was discharged in a stable condition.    Event Time In   In Facility [id number]   In Pre-Procedure 1500   Physician Available    Anesthesia Available    Pre-Op: Bedside Procedure Start    Pre-Op: Bedside Procedure Stop    Pre-Procedure Complete 1511   Out of Pre-Procedure    Anesthesia Start    Anesthesia Start Data Collection    Setup Start    Setup Complete    In  Room 1548   Prep Start    Procedure Prep Complete    Procedure Start 1553   Procedure Closing    Emergence    Procedure Finish 1557   Sedation Start 1547   Scope In    Extent Reached    Scope Out    Sedation End 1557   Out of Room 1558   Cleanup Start    Cleanup Complete    Cosmetic Start    Cosmetic Stop    Pain Mgmt In Room    Pain Mgmt Out Room    In Recovery    Anesthesia Finish    Bedside Procedure Start    Bedside Procedure Stop    Recovery Care Complete    Out of Recovery    To Phase II    In Phase II    Pain Mgmt Recovery Start    Pain Mgmt Recovery Stop    Obs Rec Start    Obs Rec Stop    Phase II Care Complete    Out of Phase II    Procedural Care Complete    Discharge    Pain Follow Up Needed    Pain Follow Up Complete      Moderate sedation was achieved with midazolam 2 mg.  Continuous monitoring of EKG, blood pressure and pulse oximetry was provided by a registered nurse during the entire course of the procedure under my supervision and recorded in the patient's medical record.   Total time for sedation was 10 minutes.

## 2022-09-22 NOTE — PLAN OF CARE
Vital signs stable. Discharge instructions reviewed with patient. Questions answered. Verbalized understanding.

## 2022-09-22 NOTE — DISCHARGE SUMMARY
[facility] - Surgery  Discharge Note  Short Stay    Procedure(s) (LRB):  Injection-steroid-epidural-cervical (N/A)    OUTCOME: Patient tolerated treatment/procedure well without complication and is now ready for discharge.    DISPOSITION: Home or Self Care    FINAL DIAGNOSIS:  Cervical radiculopathy    FOLLOWUP: In clinic    DISCHARGE INSTRUCTIONS:    Discharge Procedure Orders   Diet Adult Regular     No dressing needed     Notify your health care provider if you experience any of the following:  temperature >100.4     Activity as tolerated

## 2022-09-23 VITALS
HEART RATE: 70 BPM | RESPIRATION RATE: 16 BRPM | DIASTOLIC BLOOD PRESSURE: 58 MMHG | OXYGEN SATURATION: 100 % | BODY MASS INDEX: 32.14 KG/M2 | SYSTOLIC BLOOD PRESSURE: 120 MMHG | WEIGHT: 200 LBS | TEMPERATURE: 98 F | HEIGHT: 66 IN

## 2022-10-17 ENCOUNTER — HOSPITAL ENCOUNTER (OUTPATIENT)
Dept: RADIOLOGY | Facility: HOSPITAL | Age: 52
Discharge: HOME | End: 2022-10-17
Attending: PHYSICIAN ASSISTANT
Payer: COMMERCIAL

## 2022-10-17 ENCOUNTER — OFFICE VISIT (OUTPATIENT)
Dept: PAIN MEDICINE | Facility: CLINIC | Age: 52
End: 2022-10-17
Payer: COMMERCIAL

## 2022-10-17 VITALS
WEIGHT: 200.81 LBS | HEIGHT: 66 IN | OXYGEN SATURATION: 95 % | BODY MASS INDEX: 32.27 KG/M2 | DIASTOLIC BLOOD PRESSURE: 62 MMHG | SYSTOLIC BLOOD PRESSURE: 124 MMHG | HEART RATE: 63 BPM

## 2022-10-17 DIAGNOSIS — M50.30 DDD (DEGENERATIVE DISC DISEASE), CERVICAL: ICD-10-CM

## 2022-10-17 DIAGNOSIS — M51.36 DDD (DEGENERATIVE DISC DISEASE), LUMBAR: ICD-10-CM

## 2022-10-17 DIAGNOSIS — M47.812 CERVICAL SPONDYLOSIS: Primary | ICD-10-CM

## 2022-10-17 DIAGNOSIS — M47.812 CERVICAL SPONDYLOSIS: ICD-10-CM

## 2022-10-17 PROCEDURE — 72114 XR LUMBAR SPINE 5 VIEW WITH FLEX AND EXT: ICD-10-PCS | Mod: 26,,, | Performed by: RADIOLOGY

## 2022-10-17 PROCEDURE — 99999 PR PBB SHADOW E&M-EST. PATIENT-LVL V: ICD-10-PCS | Mod: PBBFAC,,, | Performed by: PHYSICIAN ASSISTANT

## 2022-10-17 PROCEDURE — 72052 X-RAY EXAM NECK SPINE 6/>VWS: CPT | Mod: 26,,, | Performed by: RADIOLOGY

## 2022-10-17 PROCEDURE — 99214 OFFICE O/P EST MOD 30 MIN: CPT | Mod: S$GLB,,, | Performed by: PHYSICIAN ASSISTANT

## 2022-10-17 PROCEDURE — 72114 X-RAY EXAM L-S SPINE BENDING: CPT | Mod: TC,PO

## 2022-10-17 PROCEDURE — 1159F PR MEDICATION LIST DOCUMENTED IN MEDICAL RECORD: ICD-10-PCS | Mod: CPTII,S$GLB,, | Performed by: PHYSICIAN ASSISTANT

## 2022-10-17 PROCEDURE — 3078F DIAST BP <80 MM HG: CPT | Mod: CPTII,S$GLB,, | Performed by: PHYSICIAN ASSISTANT

## 2022-10-17 PROCEDURE — 1160F RVW MEDS BY RX/DR IN RCRD: CPT | Mod: CPTII,S$GLB,, | Performed by: PHYSICIAN ASSISTANT

## 2022-10-17 PROCEDURE — 72052 XR CERVICAL SPINE 5 VIEW WITH FLEX AND EXT: ICD-10-PCS | Mod: 26,,, | Performed by: RADIOLOGY

## 2022-10-17 PROCEDURE — 72052 X-RAY EXAM NECK SPINE 6/>VWS: CPT | Mod: TC,PO

## 2022-10-17 PROCEDURE — 3078F PR MOST RECENT DIASTOLIC BLOOD PRESSURE < 80 MM HG: ICD-10-PCS | Mod: CPTII,S$GLB,, | Performed by: PHYSICIAN ASSISTANT

## 2022-10-17 PROCEDURE — 99214 PR OFFICE/OUTPT VISIT, EST, LEVL IV, 30-39 MIN: ICD-10-PCS | Mod: S$GLB,,, | Performed by: PHYSICIAN ASSISTANT

## 2022-10-17 PROCEDURE — 3074F SYST BP LT 130 MM HG: CPT | Mod: CPTII,S$GLB,, | Performed by: PHYSICIAN ASSISTANT

## 2022-10-17 PROCEDURE — 1159F MED LIST DOCD IN RCRD: CPT | Mod: CPTII,S$GLB,, | Performed by: PHYSICIAN ASSISTANT

## 2022-10-17 PROCEDURE — 3074F PR MOST RECENT SYSTOLIC BLOOD PRESSURE < 130 MM HG: ICD-10-PCS | Mod: CPTII,S$GLB,, | Performed by: PHYSICIAN ASSISTANT

## 2022-10-17 PROCEDURE — 1160F PR REVIEW ALL MEDS BY PRESCRIBER/CLIN PHARMACIST DOCUMENTED: ICD-10-PCS | Mod: CPTII,S$GLB,, | Performed by: PHYSICIAN ASSISTANT

## 2022-10-17 PROCEDURE — 72114 X-RAY EXAM L-S SPINE BENDING: CPT | Mod: 26,,, | Performed by: RADIOLOGY

## 2022-10-17 PROCEDURE — 99999 PR PBB SHADOW E&M-EST. PATIENT-LVL V: CPT | Mod: PBBFAC,,, | Performed by: PHYSICIAN ASSISTANT

## 2022-10-17 RX ORDER — SODIUM CHLORIDE, SODIUM LACTATE, POTASSIUM CHLORIDE, CALCIUM CHLORIDE 600; 310; 30; 20 MG/100ML; MG/100ML; MG/100ML; MG/100ML
INJECTION, SOLUTION INTRAVENOUS CONTINUOUS
Status: CANCELLED | OUTPATIENT
Start: 2022-10-28

## 2022-10-17 NOTE — H&P (VIEW-ONLY)
This note was completed with dictation software and grammatical errors may exist.    Chief Complaint   Patient presents with    Follow-up        HPI: Li Singh is a 52 y.o. year old female patient who has a past medical history of Anxiety, Gastric ulcer, Hyperlipidemia, and Hypertension. She presents in referral from Shikha Steen for neck pain.  She is status post C7-T1 interlaminar epidural steroid injection on 09/22/2022 initially reporting 30% relief, now reporting minimal relief.  The patient is new to me.  She states that she was feeling a little better until she used her tractor and also tripped and fell.  The patient complains of left greater than right neck pain radiating to trapezius muscles.  She reports right arm pain but only about once a week with sleeping.  Her neck pain is her biggest concern and worse with any activity.  She also complains of bilateral low back pain radiating to left greater than right lateral buttocks into the bottom of her left foot.  She reports taking a family member's Percocet at times along with Soma that has been prescribed to her.  She reports occasional numbness in her arms.  She denies weakness or incontinence.    Previous history:  The patient reports that she has had neck pain for about the last 10 years, denies any specific trauma that may have caused this.  Is located in her bilateral neck at the base of her neck out into the bilateral  numbness and tingling in the right arm especially when tilting her head to the left and gets numbness ,  and tingling in the 1st through 3rd fingers.  The pain also radiates into the trapezius sometimes into the clavicle and down the middle of her spine through the shoulder blades.   She reports that at time she gets headaches in the occipital region that advanced into the front of her head at times as well.   She denies any balance issues, denies any regan weakness.    Pain intervention history:  She apparently has undergone  "injections in her neck with no relief.She is status post C7-T1 interlaminar epidural steroid injection on 09/22/2022 initially reporting 30% relief, now reporting minimal relief.      Spine surgeries:  The patient states that she had seen Dr. Al in the past who had recommended surgery    Antineuropathics:  Gabapentin 600 3 times daily  NSAIDs:  Physical therapy:  She is done physical therapy in the past, chiropractic care with some benefit.  She states that she was seeing a chiropractor at least once a week but was not able to do this for 3 months and states that her pain greatly worsened during that time.  Antidepressants:  BuSpar 5 mg, Wellbutrin 150 mg  Muscle relaxers:  Xanax 1 mg, Soma  Opioids:  Antiplatelets/Anticoagulants:  Aspirin 81    ROS:  She reports headaches, stomach ulcer, easy bruising, joint stiffness, back pain, memory loss, dizziness, difficulty sleeping.  Balance of review of systems negative.    No results found for: LABA1C, HGBA1C    No results found for: WBC, HGB, HCT, MCV, PLT    Past Medical History:   Diagnosis Date    Anxiety     Gastric ulcer     Hyperlipidemia     Hypertension        Past Surgical History:   Procedure Laterality Date    EPIDURAL STEROID INJECTION INTO CERVICAL SPINE N/A 9/22/2022    Procedure: Injection-steroid-epidural-cervical;  Surgeon: Ronald Bernabe MD;  Location: [facility] OR;  Service: Pain Management;  Laterality: N/A;       Social History     Socioeconomic History    Marital status:    Tobacco Use    Smoking status: Unknown         Medications/Allergies: See med card    Vitals:    10/17/22 1116   BP: 124/62   Pulse: 63   SpO2: 95%   Weight: 91.1 kg (200 lb 13.4 oz)   Height: 5' 6" (1.676 m)   PainSc:   8   PainLoc: Back     Body mass index is 32.42 kg/m².    Physical exam:  Gen: A and O x3, pleasant, well-groomed  Skin: No rashes or obvious lesions  HEENT: PERRLA, no obvious deformities on ears or in canals.Trachea midline.  CVS: Regular rate and " rhythm, normal palpable pulses.  Resp: Clear to auscultation bilaterally, no wheezes or rales.  Abdomen: Soft, NT/ND.  Musculoskeletal: Able to heel walk, toe walk. No antalgic gait.     Neuro:  Motor:    Right Left   C4 Shoulder Abduction  5  5   C5 Elbow Flexion    5  5   C6 Wrist Extension  5  5   C7 Elbow Extension   5  5   C8/T1 Hand Intrinsics   5  5   C8 First Dorsal Interosseus  5  5   C8 Abductor Pollicus Brevis  5  5       Iliopsoas Quadriceps Knee  Flexion Tibialis  anterior Gastro- cnemius EHL   Lower: R / 5/5 5/5 5/5 5/5 5/5    L // 5 5 5 5        Left  Right    Triceps DTR 2+ 2+   Biceps DTR 2+ 2+   Brachioradialis DTR 2+ 2+   Patellar DTR 1+ 1+   Achilles DTR 1+ 1+   Lucio Absent  Absent   Clonus Absent Absent          Sensory: Intact and symmetrical to light touch and pinprick in C2-T1 dermatomes bilaterally. Intact and symmetrical to light touch and pinprick in L1-S1 dermatomes bilaterally.    Cervical spine: ROM is full in flexion, extension and lateral rotation with increased pain on extension greater than flexion, lateral rotation increased pain to the left greater than right  Spurling's maneuver causes neck pain to either side, left greater than right..  Myofascial exam: No Tenderness to palpation across cervical paraspinous region bilaterally.    Lumbar spine: ROM is full with flexion extension and oblique extension with no increased pain.    Talib's test causes no increased pain on either side.    Supine straight leg raise is negative bilaterally.    Internal and external rotation of the hip causes no increased pain on either side.  Myofascial exam: No tenderness to palpation across lumbar paraspinous muscles.    Imagin22 MRI C-spine:  Alignment: Reversal of the normal cervical lordosis centered at C4.  Minimal anterolisthesis of C3 on C4, 2 mm.  Minimal retrolisthesis of C4 on C5 and C5 on C6, 1-2 mm.   Vertebral Column: Vertebral body heights are maintained.  No  evidence of an acute fracture or aggressive marrow replacement process. Probable small hemangiomas within the C4 C7 vertebral bodies.  Moderate disc degeneration at C4-5 and C5-6 with moderate intervertebral disc space narrowing, degenerative endplate change and marginal osteophyte formation.   Cord: Mild flattening of the left ventral aspect of the cord at C4-5.  No focal cord signal abnormality.   Skull base and craniocervical junction: Normal.   Degenerative findings:   C2-C3: The disc is normal in configuration. Mild left facet arthropathy.  There is no neural foraminal stenosis.  There is no spinal canal stenosis.   C3-C4: Mild posterior disc osteophyte complex, which mildly effaces the ventral thecal sac.  Mild left facet arthropathy.  Mild left neural foraminal stenosis.  There is no spinal canal stenosis.   C4-C5: Left asymmetric posterior disc osteophyte complex, which mildly effaces the ventral thecal sac and flattens the left ventral aspect of the cord without significant cord compression or focal cord signal abnormality.  Mild bilateral facet arthropathy.  Moderate left uncovertebral joint spurring.  Moderate left neural foraminal stenosis.  There is no spinal canal stenosis.   C5-C6: Posterior disc osteophyte complex.  Mild bilateral facet arthropathy.  Marked right and moderate left uncovertebral joint spurring.  Severe right and moderate left neural foraminal stenosis.  Ligamentum flavum thickening.  Mild spinal canal stenosis.   C6-C7: Mild posterior disc osteophyte complex.  Mild bilateral facet arthropathy.  Mild left uncovertebral joint spurring.  Mild left neural foraminal stenosis.  There is no spinal canal stenosis.   C7-T1: The disc is normal in configuration.  Mild bilateral facet arthropathy and uncovertebral joint spurring.  Mild bilateral neural foraminal stenosis.  There is no spinal canal stenosis.    Assessment:   Li Singh is a 52 y.o. year old female patient who has a past medical  history of Anxiety, Gastric ulcer, Hyperlipidemia, and Hypertension. She presents in referral from Shikha Steen for neck pain.     1. Cervical spondylosis  Ambulatory referral/consult to Pain Clinic    X-Ray Cervical Spine 5 View With Flex And Ext    Vital signs    Place 18-22 [facility] peripheral IV     Verify informed consent    Notify physician     Notify physician     Notify physician (specify)    Diet NPO    Case Request Operating Room: Block-nerve-medial branch-cervical C4/5 and C5/6    Place in Outpatient    lactated ringers infusion      2. DDD (degenerative disc disease), cervical        3. DDD (degenerative disc disease), lumbar  MRI Lumbar Spine Without Contrast    X-Ray Lumbar Complete Including Flex And Ext          Plan:  1. The patient did not have significant relief following the epidural steroid injection and we discussed that her pain is likely more facet mediated.  I am going to schedule her for bilateral C4/5 and C5/6 diagnostic medial branch nerve blocks.  If successful we will repeat the blocks prior to proceeding with radiofrequency ablation.    2. We discussed her low back and left foot pain.  I am going to order x-rays and a lumbar spine MRI to further evaluate this.  3. We discussed medication and I advised her against taking medicine from anybody and that we do not suggest taking opioids and also do not suggest Soma.  4. Follow-up in 4 weeks postprocedure or sooner as needed.

## 2022-10-28 ENCOUNTER — HOSPITAL ENCOUNTER (OUTPATIENT)
Facility: HOSPITAL | Age: 52
Discharge: HOME | End: 2022-10-28
Attending: ANESTHESIOLOGY | Admitting: ANESTHESIOLOGY
Payer: COMMERCIAL

## 2022-10-28 ENCOUNTER — HOSPITAL ENCOUNTER (OUTPATIENT)
Dept: RADIOLOGY | Facility: HOSPITAL | Age: 52
Discharge: HOME | End: 2022-10-28
Attending: ANESTHESIOLOGY | Admitting: ANESTHESIOLOGY
Payer: COMMERCIAL

## 2022-10-28 DIAGNOSIS — M54.2 NECK PAIN: ICD-10-CM

## 2022-10-28 DIAGNOSIS — M47.812 CERVICAL SPONDYLOSIS: ICD-10-CM

## 2022-10-28 PROCEDURE — 63600175 PHARM REV CODE 636 W HCPCS: Mod: PO | Performed by: ANESTHESIOLOGY

## 2022-10-28 PROCEDURE — 25000003 PHARM REV CODE 250: Mod: PO | Performed by: ANESTHESIOLOGY

## 2022-10-28 PROCEDURE — 64491 INJ PARAVERT F JNT C/T 2 LEV: CPT | Mod: 50,KX,, | Performed by: ANESTHESIOLOGY

## 2022-10-28 PROCEDURE — 64491 PR INJ DX/THER AGNT PARAVERT FACET JOINT,IMG GUIDE,CERV/THORAC, 2ND LEVEL: ICD-10-PCS | Mod: 50,KX,, | Performed by: ANESTHESIOLOGY

## 2022-10-28 PROCEDURE — 64490 INJ PARAVERT F JNT C/T 1 LEV: CPT | Mod: 50,KX,, | Performed by: ANESTHESIOLOGY

## 2022-10-28 PROCEDURE — 99152 PR MOD CONSCIOUS SEDATION, SAME PHYS, 5+ YRS, FIRST 15 MIN: ICD-10-PCS | Mod: ,,, | Performed by: ANESTHESIOLOGY

## 2022-10-28 PROCEDURE — 64490 PR INJ DX/THER AGNT PARAVERT FACET JOINT,IMG GUIDE,CERV/THORAC, 1ST LEVEL: ICD-10-PCS | Mod: 50,KX,, | Performed by: ANESTHESIOLOGY

## 2022-10-28 PROCEDURE — 64491 INJ PARAVERT F JNT C/T 2 LEV: CPT | Mod: 50,PO | Performed by: ANESTHESIOLOGY

## 2022-10-28 PROCEDURE — 99152 MOD SED SAME PHYS/QHP 5/>YRS: CPT | Mod: ,,, | Performed by: ANESTHESIOLOGY

## 2022-10-28 PROCEDURE — 76000 FLUOROSCOPY <1 HR PHYS/QHP: CPT | Mod: TC,PO

## 2022-10-28 PROCEDURE — 64490 INJ PARAVERT F JNT C/T 1 LEV: CPT | Mod: 50,PO | Performed by: ANESTHESIOLOGY

## 2022-10-28 RX ORDER — LIDOCAINE HYDROCHLORIDE 10 MG/ML
INJECTION, SOLUTION EPIDURAL; INFILTRATION; INTRACAUDAL; PERINEURAL
Status: DISCONTINUED | OUTPATIENT
Start: 2022-10-28 | End: 2022-10-28 | Stop reason: HOSPADM

## 2022-10-28 RX ORDER — BUPIVACAINE HYDROCHLORIDE 5 MG/ML
INJECTION, SOLUTION EPIDURAL; INTRACAUDAL
Status: DISCONTINUED | OUTPATIENT
Start: 2022-10-28 | End: 2022-10-28 | Stop reason: HOSPADM

## 2022-10-28 RX ORDER — MIDAZOLAM HYDROCHLORIDE 2 MG/2ML
INJECTION, SOLUTION INTRAMUSCULAR; INTRAVENOUS
Status: DISCONTINUED | OUTPATIENT
Start: 2022-10-28 | End: 2022-10-28 | Stop reason: HOSPADM

## 2022-10-28 RX ORDER — SODIUM CHLORIDE, SODIUM LACTATE, POTASSIUM CHLORIDE, CALCIUM CHLORIDE 600; 310; 30; 20 MG/100ML; MG/100ML; MG/100ML; MG/100ML
INJECTION, SOLUTION INTRAVENOUS CONTINUOUS
Status: DISCONTINUED | OUTPATIENT
Start: 2022-10-28 | End: 2022-10-28 | Stop reason: HOSPADM

## 2022-10-28 RX ADMIN — SODIUM CHLORIDE, SODIUM LACTATE, POTASSIUM CHLORIDE, AND CALCIUM CHLORIDE: .6; .31; .03; .02 INJECTION, SOLUTION INTRAVENOUS at 02:10

## 2022-10-28 NOTE — DISCHARGE SUMMARY
[facility] - Surgery  Discharge Note  Short Stay    Procedure(s) (LRB):  Block-nerve-medial branch-cervical C4/5 and C5/6 (Bilateral)      OUTCOME: Patient tolerated treatment/procedure well without complication and is now ready for discharge.    DISPOSITION: Home or Self Care    FINAL DIAGNOSIS:  Cervical spondylosis    FOLLOWUP: In clinic    DISCHARGE INSTRUCTIONS:    Discharge Procedure Orders   Diet Adult Regular     No dressing needed     Notify your health care provider if you experience any of the following:  temperature >100.4     Activity as tolerated

## 2022-10-28 NOTE — OP NOTE
PROCEDURE DATE: 10/28/2022    PROCEDURE:  Diagnostic Cervical medial branch block of the bilateral C4/5 and C5/6 medial branch nerves on the bilateral-side utilizing fluoroscopy    DIAGNOSIS:  Cervical spondylosis    PHYSICIAN: Ronald Bernabe MD    MEDICATIONS INJECTED:  0.25% bupivicaine, 0.5ml at each level.    LOCAL ANESTHETIC USED:   1% lidocaine, 1ml at each level.    SEDATION MEDICATIONS: 4mg versed    ESTIMATED BLOOD LOSS:  none    COMPLICATIONS:  none    TECHNIQUE : A time-out was taken to identify patient and procedure side prior to starting the procedure.  The patient was positioned prone with the site of interest side up. The patient was prepped and draped in the usual sterile fashion using ChloraPrep and sterile towels.  The level was determined under fluoroscopic guidance using a slightly posteriorly oblique view.   Local anesthetic was infiltrated superficially at the skin level.  Then, a 25 gauge 3.5 inch needle was inserted to the anatomic location of the midsection of the lateral masses of the right and then left C4,5,6. A cross table view was then taken to ensure that needles did not cross into neural foramina.  The above noted medication was then injected. The patient tolerated the procedure well.     The patient was monitored after the procedure. The patient will be contacted tomorrow to determine the extent of relief. The patient was given post procedure and discharge instructions to follow at home. The patient was discharged in a stable condition    Event Time In   In Facility 1345   In Pre-Procedure 1414   Physician Available    Anesthesia Available    Pre-Op: Bedside Procedure Start    Pre-Op: Bedside Procedure Stop    Pre-Procedure Complete 1436   Out of Pre-Procedure    Anesthesia Start    Anesthesia Start Data Collection    Setup Start    Setup Complete    In Room 1446   Prep Start    Procedure Prep Complete    Procedure Start 1452   Procedure Closing    Emergence    Procedure Finish  1501   Sedation Start 1445   Scope In    Extent Reached    Scope Out    Sedation End 1504   Out of Room 1504   Cleanup Start    Cleanup Complete    Cosmetic Start    Cosmetic Stop    Pain Mgmt In Room    Pain Mgmt Out Room    In Recovery    Anesthesia Finish    Bedside Procedure Start    Bedside Procedure Stop    Recovery Care Complete    Out of Recovery    To Phase II    In Phase II    Pain Mgmt Recovery Start 1505   Pain Mgmt Recovery Stop    Obs Rec Start    Obs Rec Stop    Phase II Care Complete    Out of Phase II    Procedural Care Complete 1527   Discharge    Pain Follow Up Needed    Pain Follow Up Complete      Moderate sedation was achieved with midazolam 4 mg.  Continuous monitoring of EKG, blood pressure and pulse oximetry was provided by a registered nurse during the entire course of the procedure under my supervision and recorded in the patient's medical record.   Total time for sedation was 19 minutes.

## 2022-10-28 NOTE — DISCHARGE INSTRUCTIONS
PAIN MANAGEMENT    HOME CARE INSTRUCTIONS   Do not use heat (such as a heating pad) for 24 hours.  You may apply an ice pack to the injection site for 20 minutes at a time for the first 24 hours for soreness/discomfort at injection site   Keep site clean and dry for 24 hours. If bandaid is present, remove when desired.   Do not drive until tomorrow.  Take care when walking after a lumbar injection.   Resume home medication as prescribed today.  Resume Aspirin, Plavix, or Coumadin the day after the procedure unless other wise instructed.    BLOCKS  Resume regular activities today.  Pain office will call in next 2 days.    CALL PHYSICIAN FOR:  Severe increase in your usual pain or the appearance of new pain.  Prolonged or increasing weakness or numbness in the legs or arms.  Fever greater than 100 degrees F.  Drainage, redness, active bleeding, or increased swelling at the injection site.  Headache that increases when your head is upright and decreases when you lie flat.    FOR EMERGENCIES:   Go directly to the emergency department for any shortness of breath, chest pain, or problems breathing.

## 2022-10-31 ENCOUNTER — TELEPHONE (OUTPATIENT)
Dept: SURGERY | Facility: HOSPITAL | Age: 52
End: 2022-10-31
Payer: COMMERCIAL

## 2022-10-31 ENCOUNTER — HOSPITAL ENCOUNTER (OUTPATIENT)
Dept: RADIOLOGY | Facility: HOSPITAL | Age: 52
Discharge: HOME | End: 2022-10-31
Attending: PHYSICIAN ASSISTANT
Payer: COMMERCIAL

## 2022-10-31 VITALS
DIASTOLIC BLOOD PRESSURE: 59 MMHG | TEMPERATURE: 98 F | OXYGEN SATURATION: 100 % | BODY MASS INDEX: 32.14 KG/M2 | WEIGHT: 200 LBS | SYSTOLIC BLOOD PRESSURE: 118 MMHG | HEART RATE: 72 BPM | HEIGHT: 66 IN | RESPIRATION RATE: 16 BRPM

## 2022-10-31 DIAGNOSIS — M51.36 DDD (DEGENERATIVE DISC DISEASE), LUMBAR: ICD-10-CM

## 2022-10-31 PROCEDURE — 72148 MRI LUMBAR SPINE W/O DYE: CPT | Mod: 26,,, | Performed by: RADIOLOGY

## 2022-10-31 PROCEDURE — 72148 MRI LUMBAR SPINE W/O DYE: CPT | Mod: TC,PO

## 2022-10-31 PROCEDURE — 72148 MRI LUMBAR SPINE WITHOUT CONTRAST: ICD-10-PCS | Mod: 26,,, | Performed by: RADIOLOGY

## 2022-10-31 NOTE — TELEPHONE ENCOUNTER
Call returned to Pt to review results from her Cervical MBB, C4/5 and C5/6 ELLIOT performed by Dr. Bernabe to ask:    1. What percentage of pain relief did you receive following the block, from 0-100%? What was pain score from 0-10?    2. How many hours did pain relief last following the block?      3. During this time please describe in detail the activities you were able to do?    1- 0% relief. Pt states her whole neck hurt and was very stiff. Up the sides of her neck were very sore to touch.    2- 0    3- Pt states she left to go shopping and had to go back to the car because her neck was so painful.     Office f/u scheduled for 11-18-22.

## 2022-10-31 NOTE — TELEPHONE ENCOUNTER
Patient states she was in severe pain after procedure on Friday until Saturday evening. Patient states the block never worked and wants to try something else.

## 2022-11-02 ENCOUNTER — TELEPHONE (OUTPATIENT)
Dept: PAIN MEDICINE | Facility: CLINIC | Age: 52
End: 2022-11-02
Payer: COMMERCIAL

## 2022-11-03 ENCOUNTER — TELEPHONE (OUTPATIENT)
Dept: PAIN MEDICINE | Facility: CLINIC | Age: 52
End: 2022-11-03
Payer: COMMERCIAL

## 2022-11-03 NOTE — TELEPHONE ENCOUNTER
Please let the patient know that I reviewed her lumbar spine MRI and she does have degenerative changes in the form of arthritis and mild spinal stenosis likely causing her pain.  We can discuss treatment options when she follows up later this month.

## 2022-11-03 NOTE — TELEPHONE ENCOUNTER
Call placed to Pt to advise per TONYA Damon, he has  reviewed her lumbar spine MRI and she does have degenerative changes in the form of arthritis and mild spinal stenosis likely causing her pain. He can discuss treatment options when she follows up later this month. Pt verbalized understanding.

## 2022-11-08 ENCOUNTER — OFFICE VISIT (OUTPATIENT)
Dept: PAIN MEDICINE | Facility: CLINIC | Age: 52
End: 2022-11-08
Payer: COMMERCIAL

## 2022-11-08 VITALS
HEIGHT: 66 IN | DIASTOLIC BLOOD PRESSURE: 75 MMHG | HEART RATE: 71 BPM | WEIGHT: 195.31 LBS | SYSTOLIC BLOOD PRESSURE: 124 MMHG | BODY MASS INDEX: 31.39 KG/M2

## 2022-11-08 DIAGNOSIS — M54.16 LUMBAR RADICULOPATHY: Primary | ICD-10-CM

## 2022-11-08 DIAGNOSIS — M51.36 DDD (DEGENERATIVE DISC DISEASE), LUMBAR: ICD-10-CM

## 2022-11-08 DIAGNOSIS — M50.30 DDD (DEGENERATIVE DISC DISEASE), CERVICAL: ICD-10-CM

## 2022-11-08 DIAGNOSIS — M54.12 CERVICAL RADICULOPATHY: ICD-10-CM

## 2022-11-08 PROCEDURE — 1159F MED LIST DOCD IN RCRD: CPT | Mod: CPTII,S$GLB,, | Performed by: PHYSICIAN ASSISTANT

## 2022-11-08 PROCEDURE — 1159F PR MEDICATION LIST DOCUMENTED IN MEDICAL RECORD: ICD-10-PCS | Mod: CPTII,S$GLB,, | Performed by: PHYSICIAN ASSISTANT

## 2022-11-08 PROCEDURE — 3074F SYST BP LT 130 MM HG: CPT | Mod: CPTII,S$GLB,, | Performed by: PHYSICIAN ASSISTANT

## 2022-11-08 PROCEDURE — 99999 PR PBB SHADOW E&M-EST. PATIENT-LVL V: ICD-10-PCS | Mod: PBBFAC,,, | Performed by: PHYSICIAN ASSISTANT

## 2022-11-08 PROCEDURE — 99214 OFFICE O/P EST MOD 30 MIN: CPT | Mod: S$GLB,,, | Performed by: PHYSICIAN ASSISTANT

## 2022-11-08 PROCEDURE — 1160F PR REVIEW ALL MEDS BY PRESCRIBER/CLIN PHARMACIST DOCUMENTED: ICD-10-PCS | Mod: CPTII,S$GLB,, | Performed by: PHYSICIAN ASSISTANT

## 2022-11-08 PROCEDURE — 3008F BODY MASS INDEX DOCD: CPT | Mod: CPTII,S$GLB,, | Performed by: PHYSICIAN ASSISTANT

## 2022-11-08 PROCEDURE — 99999 PR PBB SHADOW E&M-EST. PATIENT-LVL V: CPT | Mod: PBBFAC,,, | Performed by: PHYSICIAN ASSISTANT

## 2022-11-08 PROCEDURE — 3078F PR MOST RECENT DIASTOLIC BLOOD PRESSURE < 80 MM HG: ICD-10-PCS | Mod: CPTII,S$GLB,, | Performed by: PHYSICIAN ASSISTANT

## 2022-11-08 PROCEDURE — 3074F PR MOST RECENT SYSTOLIC BLOOD PRESSURE < 130 MM HG: ICD-10-PCS | Mod: CPTII,S$GLB,, | Performed by: PHYSICIAN ASSISTANT

## 2022-11-08 PROCEDURE — 99214 PR OFFICE/OUTPT VISIT, EST, LEVL IV, 30-39 MIN: ICD-10-PCS | Mod: S$GLB,,, | Performed by: PHYSICIAN ASSISTANT

## 2022-11-08 PROCEDURE — 3008F PR BODY MASS INDEX (BMI) DOCUMENTED: ICD-10-PCS | Mod: CPTII,S$GLB,, | Performed by: PHYSICIAN ASSISTANT

## 2022-11-08 PROCEDURE — 3078F DIAST BP <80 MM HG: CPT | Mod: CPTII,S$GLB,, | Performed by: PHYSICIAN ASSISTANT

## 2022-11-08 PROCEDURE — 1160F RVW MEDS BY RX/DR IN RCRD: CPT | Mod: CPTII,S$GLB,, | Performed by: PHYSICIAN ASSISTANT

## 2022-11-08 RX ORDER — SPIRONOLACTONE 50 MG/1
TABLET, FILM COATED ORAL
COMMUNITY
Start: 2022-09-14

## 2022-11-08 RX ORDER — ALPRAZOLAM 0.5 MG/1
1 TABLET, ORALLY DISINTEGRATING ORAL ONCE AS NEEDED
Status: CANCELLED | OUTPATIENT
Start: 2022-11-08 | End: 2034-04-06

## 2022-11-08 RX ORDER — TERBINAFINE HYDROCHLORIDE 250 MG/1
TABLET ORAL
COMMUNITY

## 2022-11-08 NOTE — PROGRESS NOTES
This note was completed with dictation software and grammatical errors may exist.    Chief Complaint   Patient presents with    Neck Pain     C/o neck pain    Hand Problem     C/o numbness in right thumb         HPI: Li Singh is a 52 y.o. year old female patient who has a past medical history of Anxiety, Gastric ulcer, Hyperlipidemia, Hypertension, and Sleep apnea. She presents in referral from No ref. provider found for neck pain.  She is status post bilateral C4/5 and C5/6 diagnostic medial branch nerve blocks on 10/28/2022 with 0% relief.  She continues to have left greater than right neck pain radiating to the trapezius muscles.  She is reporting intermittent numbness down her right arm and into her right thumb.  She also complains of bilateral low back pain radiating to the left lateral buttock and left lateral foot greater than the bottom of her right heel.  She denies any numbness in her legs.  Her pain is worse with activity and improved with rest.  She denies any specific weakness but reports she is not as strong as she used to be.    Previous history:  The patient reports that she has had neck pain for about the last 10 years, denies any specific trauma that may have caused this.  Is located in her bilateral neck at the base of her neck out into the bilateral  numbness and tingling in the right arm especially when tilting her head to the left and gets numbness ,  and tingling in the 1st through 3rd fingers.  The pain also radiates into the trapezius sometimes into the clavicle and down the middle of her spine through the shoulder blades.   She reports that at time she gets headaches in the occipital region that advanced into the front of her head at times as well.   She denies any balance issues, denies any regan weakness.    Pain intervention history:  She apparently has undergone injections in her neck with no relief. She is status post C7-T1 interlaminar epidural steroid injection on 09/22/2022 with  30% relief, reports that falling and using the tractor after the injection may have affected the results. She is status post bilateral C4/5 and C5/6 diagnostic medial branch nerve blocks on 10/28/2022 with 0% relief.    Spine surgeries:  The patient states that she had seen Dr. Al in the past who had recommended surgery    Antineuropathics:  Gabapentin 600 3 times daily  NSAIDs:  Physical therapy:  She is done physical therapy in the past, chiropractic care with some benefit.  She states that she was seeing a chiropractor at least once a week but was not able to do this for 3 months and states that her pain greatly worsened during that time.  Antidepressants:  BuSpar 5 mg, Wellbutrin 150 mg  Muscle relaxers:  Xanax 1 mg, Soma  Opioids:  Antiplatelets/Anticoagulants:  Aspirin 81    ROS:  She reports headaches, stomach ulcer, easy bruising, joint stiffness, back pain, memory loss, dizziness, difficulty sleeping.  Balance of review of systems negative.    No results found for: LABA1C, HGBA1C    No results found for: WBC, HGB, HCT, MCV, PLT    Past Medical History:   Diagnosis Date    Anxiety     Gastric ulcer     Hyperlipidemia     Hypertension     Sleep apnea        Past Surgical History:   Procedure Laterality Date    EPIDURAL STEROID INJECTION INTO CERVICAL SPINE N/A 9/22/2022    Procedure: Injection-steroid-epidural-cervical;  Surgeon: Ronald Bernabe MD;  Location: [facility] OR;  Service: Pain Management;  Laterality: N/A;    INJECTION OF ANESTHETIC AGENT AROUND MEDIAL BRANCH NERVES INNERVATING CERVICAL FACET JOINT Bilateral 10/28/2022    Procedure: Block-nerve-medial branch-cervical C4/5 and C5/6;  Surgeon: Ronald Bernabe MD;  Location: [facility] OR;  Service: Pain Management;  Laterality: Bilateral;       Social History     Socioeconomic History    Marital status:    Tobacco Use    Smoking status: Never    Smokeless tobacco: Never   Substance and Sexual Activity    Alcohol use: Not Currently    Drug  "use: Not Currently         Medications/Allergies: See med card    Vitals:    11/08/22 0809   BP: 124/75   Pulse: 71   Weight: 88.6 kg (195 lb 5.2 oz)   Height: 5' 6" (1.676 m)   PainSc:   8   PainLoc: Neck     Body mass index is 31.53 kg/m².    Physical exam:  Gen: A and O x3, pleasant, well-groomed  Skin: No rashes or obvious lesions  HEENT: PERRLA, no obvious deformities on ears or in canals.Trachea midline.  CVS: Regular rate and rhythm, normal palpable pulses.  Resp: Clear to auscultation bilaterally, no wheezes or rales.  Abdomen: Soft, NT/ND.  Musculoskeletal: Able to heel walk, toe walk. No antalgic gait.     Neuro:  Motor:    Right Left   C4 Shoulder Abduction  5  5   C5 Elbow Flexion    5  5   C6 Wrist Extension  5  5   C7 Elbow Extension   5  5   C8/T1 Hand Intrinsics   5  5   C8 First Dorsal Interosseus  5  5   C8 Abductor Pollicus Brevis  5  5       Iliopsoas Quadriceps Knee  Flexion Tibialis  anterior Gastro- cnemius EHL   Lower: R 5/5 5/5 5/5 5/5 5/5 5/5    L 5/5 5/5 5/5 5/5 5/5 5/5        Left  Right    Triceps DTR 2+ 2+   Biceps DTR 2+ 2+   Brachioradialis DTR 2+ 2+   Patellar DTR 1+ 1+   Achilles DTR 1+ 1+   Lucio Absent  Absent   Clonus Absent Absent          Sensory: Intact and symmetrical to light touch and pinprick in C2-T1 dermatomes bilaterally. Intact and symmetrical to light touch and pinprick in L1-S1 dermatomes bilaterally.    Cervical spine: ROM is full in flexion, extension and lateral rotation with increased pain on extension greater than flexion, lateral rotation increased pain to the left greater than right  Spurling's maneuver causes neck pain to either side, left greater than right..  Myofascial exam: No Tenderness to palpation across cervical paraspinous region bilaterally.    Lumbar spine: ROM is full with flexion extension and oblique extension with no increased pain.    Talib's test causes no increased pain on either side.    Supine straight leg raise is negative " bilaterally.    Internal and external rotation of the hip causes no increased pain on either side.  Myofascial exam: No tenderness to palpation across lumbar paraspinous muscles.    Imagin22 MRI C-spine:  Alignment: Reversal of the normal cervical lordosis centered at C4.  Minimal anterolisthesis of C3 on C4, 2 mm.  Minimal retrolisthesis of C4 on C5 and C5 on C6, 1-2 mm.   Vertebral Column: Vertebral body heights are maintained.  No evidence of an acute fracture or aggressive marrow replacement process. Probable small hemangiomas within the C4 C7 vertebral bodies.  Moderate disc degeneration at C4-5 and C5-6 with moderate intervertebral disc space narrowing, degenerative endplate change and marginal osteophyte formation.   Cord: Mild flattening of the left ventral aspect of the cord at C4-5.  No focal cord signal abnormality.   Skull base and craniocervical junction: Normal.   Degenerative findings:   C2-C3: The disc is normal in configuration. Mild left facet arthropathy.  There is no neural foraminal stenosis.  There is no spinal canal stenosis.   C3-C4: Mild posterior disc osteophyte complex, which mildly effaces the ventral thecal sac.  Mild left facet arthropathy.  Mild left neural foraminal stenosis.  There is no spinal canal stenosis.   C4-C5: Left asymmetric posterior disc osteophyte complex, which mildly effaces the ventral thecal sac and flattens the left ventral aspect of the cord without significant cord compression or focal cord signal abnormality.  Mild bilateral facet arthropathy.  Moderate left uncovertebral joint spurring.  Moderate left neural foraminal stenosis.  There is no spinal canal stenosis.   C5-C6: Posterior disc osteophyte complex.  Mild bilateral facet arthropathy.  Marked right and moderate left uncovertebral joint spurring.  Severe right and moderate left neural foraminal stenosis.  Ligamentum flavum thickening.  Mild spinal canal stenosis.   C6-C7: Mild posterior disc  osteophyte complex.  Mild bilateral facet arthropathy.  Mild left uncovertebral joint spurring.  Mild left neural foraminal stenosis.  There is no spinal canal stenosis.   C7-T1: The disc is normal in configuration.  Mild bilateral facet arthropathy and uncovertebral joint spurring.  Mild bilateral neural foraminal stenosis.  There is no spinal canal stenosis.    10/31/2022 MRI lumbar spine   Alignment: Mild dextroconvex curvature of lower lumbar spine and levoconvex curvature of the thoracolumbar junction.  Mild grade 1 anterolisthesis of L4 on L5 and L5 on S1.     Vertebral column: Vertebral body heights are maintained.  No evidence of an acute fracture or aggressive marrow replacement process. Marked right asymmetric disc space narrowing at T11-12 with mixed degenerative endplate change and marginal osteophyte formation.  Multilevel disc degeneration throughout the lumbar spine with mild-to-moderate disc space narrowing and disc bulges, most pronounced at L1-2, L3-4 and L5-S1.     Cord: Normal.  Conus terminates at L1.     Degenerative findings:     T11-12: Right asymmetric diffuse disc bulge with osteophytic ridging.  Mild bilateral facet arthropathy and ligamentum flavum thickening.  Moderate right neural foraminal stenosis.  Mild spinal canal stenosis.     T12-L1: No significant disc abnormality.  Mild bilateral facet arthropathy ligamentum flavum thickening.  No neural foraminal or spinal canal stenosis.     L1-L2: Diffuse disc bulge with osteophytic ridging.  Mild bilateral facet arthropathy ligamentum flavum thickening.  Mild bilateral neural foraminal stenosis.  There is no spinal canal stenosis.     L2-L3: Mild diffuse disc bulge with osteophytic ridging.  Mild bilateral facet arthropathy ligamentum flavum thickening.  Mild left neural foraminal stenosis.  There is no spinal canal stenosis.     L3-L4: Diffuse disc bulge with osteophytic ridging.  Moderate bilateral facet arthropathy.  Ligamentum flavum  thickening.  Mild-to-moderate left and mild right neural foraminal stenosis.  Mild spinal canal stenosis.     L4-L5: Mild diffuse disc bulge.  Marked left greater than right facet arthropathy with left-sided facet effusion.  Ligamentum flavum thickening.  Mild bilateral neural foraminal stenosis.  Mild spinal canal stenosis.     L5-S1: Diffuse disc bulge with osteophytic ridging and superimposed right central/subarticular disc protrusion through an annular fissure.  Marked right greater than left facet arthropathy with facet effusions.  Mild-to-moderate right and mild left neural foraminal stenosis.  Mild spinal canal stenosis.     Paraspinal muscles & soft tissues: No significant abnormalities.      Assessment:   Li Singh is a 52 y.o. year old female patient who has a past medical history of Anxiety, Gastric ulcer, Hyperlipidemia, and Hypertension. She presents in referral from Shikha Steen for neck pain.     1. Lumbar radiculopathy  Vital signs    Verify informed consent    Notify physician     Notify physician     Notify physician (specify)    Diet NPO    Case Request Operating Room: Injection-steroid-epidural-lumbar L5/S1    Place in Outpatient    alprazolam ODT dissolvable tablet 1 mg      2. DDD (degenerative disc disease), lumbar        3. DDD (degenerative disc disease), cervical        4. Cervical radiculopathy            Plan:  1. Unfortunately she did not have relief following the diagnostic medial branch nerve blocks so we will not repeat the blocks or proceed with radiofrequency ablation.  We discussed considering another cervical epidural steroid injection in the future because the results may have been affected from the significant amount of work she did after the injection and a fall that she had.  2. We reviewed her lumbar spine MRI together and she does have a mild disc bulge at L5/S1 that is close to contacting the descending S1 nerves.  Since she is having what seemed to be S1 symptoms  on the left, I am going to schedule her for an L5/S1 interlaminar epidural steroid injection.  3. She will follow-up 2 weeks after the injection or sooner as needed.

## 2022-11-08 NOTE — H&P (VIEW-ONLY)
This note was completed with dictation software and grammatical errors may exist.    Chief Complaint   Patient presents with    Neck Pain     C/o neck pain    Hand Problem     C/o numbness in right thumb         HPI: Li Singh is a 52 y.o. year old female patient who has a past medical history of Anxiety, Gastric ulcer, Hyperlipidemia, Hypertension, and Sleep apnea. She presents in referral from No ref. provider found for neck pain.  She is status post bilateral C4/5 and C5/6 diagnostic medial branch nerve blocks on 10/28/2022 with 0% relief.  She continues to have left greater than right neck pain radiating to the trapezius muscles.  She is reporting intermittent numbness down her right arm and into her right thumb.  She also complains of bilateral low back pain radiating to the left lateral buttock and left lateral foot greater than the bottom of her right heel.  She denies any numbness in her legs.  Her pain is worse with activity and improved with rest.  She denies any specific weakness but reports she is not as strong as she used to be.    Previous history:  The patient reports that she has had neck pain for about the last 10 years, denies any specific trauma that may have caused this.  Is located in her bilateral neck at the base of her neck out into the bilateral  numbness and tingling in the right arm especially when tilting her head to the left and gets numbness ,  and tingling in the 1st through 3rd fingers.  The pain also radiates into the trapezius sometimes into the clavicle and down the middle of her spine through the shoulder blades.   She reports that at time she gets headaches in the occipital region that advanced into the front of her head at times as well.   She denies any balance issues, denies any regan weakness.    Pain intervention history:  She apparently has undergone injections in her neck with no relief. She is status post C7-T1 interlaminar epidural steroid injection on 09/22/2022 with  30% relief, reports that falling and using the tractor after the injection may have affected the results. She is status post bilateral C4/5 and C5/6 diagnostic medial branch nerve blocks on 10/28/2022 with 0% relief.    Spine surgeries:  The patient states that she had seen Dr. Al in the past who had recommended surgery    Antineuropathics:  Gabapentin 600 3 times daily  NSAIDs:  Physical therapy:  She is done physical therapy in the past, chiropractic care with some benefit.  She states that she was seeing a chiropractor at least once a week but was not able to do this for 3 months and states that her pain greatly worsened during that time.  Antidepressants:  BuSpar 5 mg, Wellbutrin 150 mg  Muscle relaxers:  Xanax 1 mg, Soma  Opioids:  Antiplatelets/Anticoagulants:  Aspirin 81    ROS:  She reports headaches, stomach ulcer, easy bruising, joint stiffness, back pain, memory loss, dizziness, difficulty sleeping.  Balance of review of systems negative.    No results found for: LABA1C, HGBA1C    No results found for: WBC, HGB, HCT, MCV, PLT    Past Medical History:   Diagnosis Date    Anxiety     Gastric ulcer     Hyperlipidemia     Hypertension     Sleep apnea        Past Surgical History:   Procedure Laterality Date    EPIDURAL STEROID INJECTION INTO CERVICAL SPINE N/A 9/22/2022    Procedure: Injection-steroid-epidural-cervical;  Surgeon: Ronald Bernabe MD;  Location: [facility] OR;  Service: Pain Management;  Laterality: N/A;    INJECTION OF ANESTHETIC AGENT AROUND MEDIAL BRANCH NERVES INNERVATING CERVICAL FACET JOINT Bilateral 10/28/2022    Procedure: Block-nerve-medial branch-cervical C4/5 and C5/6;  Surgeon: Ronald Bernabe MD;  Location: [facility] OR;  Service: Pain Management;  Laterality: Bilateral;       Social History     Socioeconomic History    Marital status:    Tobacco Use    Smoking status: Never    Smokeless tobacco: Never   Substance and Sexual Activity    Alcohol use: Not Currently    Drug  "use: Not Currently         Medications/Allergies: See med card    Vitals:    11/08/22 0809   BP: 124/75   Pulse: 71   Weight: 88.6 kg (195 lb 5.2 oz)   Height: 5' 6" (1.676 m)   PainSc:   8   PainLoc: Neck     Body mass index is 31.53 kg/m².    Physical exam:  Gen: A and O x3, pleasant, well-groomed  Skin: No rashes or obvious lesions  HEENT: PERRLA, no obvious deformities on ears or in canals.Trachea midline.  CVS: Regular rate and rhythm, normal palpable pulses.  Resp: Clear to auscultation bilaterally, no wheezes or rales.  Abdomen: Soft, NT/ND.  Musculoskeletal: Able to heel walk, toe walk. No antalgic gait.     Neuro:  Motor:    Right Left   C4 Shoulder Abduction  5  5   C5 Elbow Flexion    5  5   C6 Wrist Extension  5  5   C7 Elbow Extension   5  5   C8/T1 Hand Intrinsics   5  5   C8 First Dorsal Interosseus  5  5   C8 Abductor Pollicus Brevis  5  5       Iliopsoas Quadriceps Knee  Flexion Tibialis  anterior Gastro- cnemius EHL   Lower: R 5/5 5/5 5/5 5/5 5/5 5/5    L 5/5 5/5 5/5 5/5 5/5 5/5        Left  Right    Triceps DTR 2+ 2+   Biceps DTR 2+ 2+   Brachioradialis DTR 2+ 2+   Patellar DTR 1+ 1+   Achilles DTR 1+ 1+   Lucio Absent  Absent   Clonus Absent Absent          Sensory: Intact and symmetrical to light touch and pinprick in C2-T1 dermatomes bilaterally. Intact and symmetrical to light touch and pinprick in L1-S1 dermatomes bilaterally.    Cervical spine: ROM is full in flexion, extension and lateral rotation with increased pain on extension greater than flexion, lateral rotation increased pain to the left greater than right  Spurling's maneuver causes neck pain to either side, left greater than right..  Myofascial exam: No Tenderness to palpation across cervical paraspinous region bilaterally.    Lumbar spine: ROM is full with flexion extension and oblique extension with no increased pain.    Talib's test causes no increased pain on either side.    Supine straight leg raise is negative " bilaterally.    Internal and external rotation of the hip causes no increased pain on either side.  Myofascial exam: No tenderness to palpation across lumbar paraspinous muscles.    Imagin22 MRI C-spine:  Alignment: Reversal of the normal cervical lordosis centered at C4.  Minimal anterolisthesis of C3 on C4, 2 mm.  Minimal retrolisthesis of C4 on C5 and C5 on C6, 1-2 mm.   Vertebral Column: Vertebral body heights are maintained.  No evidence of an acute fracture or aggressive marrow replacement process. Probable small hemangiomas within the C4 C7 vertebral bodies.  Moderate disc degeneration at C4-5 and C5-6 with moderate intervertebral disc space narrowing, degenerative endplate change and marginal osteophyte formation.   Cord: Mild flattening of the left ventral aspect of the cord at C4-5.  No focal cord signal abnormality.   Skull base and craniocervical junction: Normal.   Degenerative findings:   C2-C3: The disc is normal in configuration. Mild left facet arthropathy.  There is no neural foraminal stenosis.  There is no spinal canal stenosis.   C3-C4: Mild posterior disc osteophyte complex, which mildly effaces the ventral thecal sac.  Mild left facet arthropathy.  Mild left neural foraminal stenosis.  There is no spinal canal stenosis.   C4-C5: Left asymmetric posterior disc osteophyte complex, which mildly effaces the ventral thecal sac and flattens the left ventral aspect of the cord without significant cord compression or focal cord signal abnormality.  Mild bilateral facet arthropathy.  Moderate left uncovertebral joint spurring.  Moderate left neural foraminal stenosis.  There is no spinal canal stenosis.   C5-C6: Posterior disc osteophyte complex.  Mild bilateral facet arthropathy.  Marked right and moderate left uncovertebral joint spurring.  Severe right and moderate left neural foraminal stenosis.  Ligamentum flavum thickening.  Mild spinal canal stenosis.   C6-C7: Mild posterior disc  osteophyte complex.  Mild bilateral facet arthropathy.  Mild left uncovertebral joint spurring.  Mild left neural foraminal stenosis.  There is no spinal canal stenosis.   C7-T1: The disc is normal in configuration.  Mild bilateral facet arthropathy and uncovertebral joint spurring.  Mild bilateral neural foraminal stenosis.  There is no spinal canal stenosis.    10/31/2022 MRI lumbar spine   Alignment: Mild dextroconvex curvature of lower lumbar spine and levoconvex curvature of the thoracolumbar junction.  Mild grade 1 anterolisthesis of L4 on L5 and L5 on S1.     Vertebral column: Vertebral body heights are maintained.  No evidence of an acute fracture or aggressive marrow replacement process. Marked right asymmetric disc space narrowing at T11-12 with mixed degenerative endplate change and marginal osteophyte formation.  Multilevel disc degeneration throughout the lumbar spine with mild-to-moderate disc space narrowing and disc bulges, most pronounced at L1-2, L3-4 and L5-S1.     Cord: Normal.  Conus terminates at L1.     Degenerative findings:     T11-12: Right asymmetric diffuse disc bulge with osteophytic ridging.  Mild bilateral facet arthropathy and ligamentum flavum thickening.  Moderate right neural foraminal stenosis.  Mild spinal canal stenosis.     T12-L1: No significant disc abnormality.  Mild bilateral facet arthropathy ligamentum flavum thickening.  No neural foraminal or spinal canal stenosis.     L1-L2: Diffuse disc bulge with osteophytic ridging.  Mild bilateral facet arthropathy ligamentum flavum thickening.  Mild bilateral neural foraminal stenosis.  There is no spinal canal stenosis.     L2-L3: Mild diffuse disc bulge with osteophytic ridging.  Mild bilateral facet arthropathy ligamentum flavum thickening.  Mild left neural foraminal stenosis.  There is no spinal canal stenosis.     L3-L4: Diffuse disc bulge with osteophytic ridging.  Moderate bilateral facet arthropathy.  Ligamentum flavum  thickening.  Mild-to-moderate left and mild right neural foraminal stenosis.  Mild spinal canal stenosis.     L4-L5: Mild diffuse disc bulge.  Marked left greater than right facet arthropathy with left-sided facet effusion.  Ligamentum flavum thickening.  Mild bilateral neural foraminal stenosis.  Mild spinal canal stenosis.     L5-S1: Diffuse disc bulge with osteophytic ridging and superimposed right central/subarticular disc protrusion through an annular fissure.  Marked right greater than left facet arthropathy with facet effusions.  Mild-to-moderate right and mild left neural foraminal stenosis.  Mild spinal canal stenosis.     Paraspinal muscles & soft tissues: No significant abnormalities.      Assessment:   Li Singh is a 52 y.o. year old female patient who has a past medical history of Anxiety, Gastric ulcer, Hyperlipidemia, and Hypertension. She presents in referral from Shikha Steen for neck pain.     1. Lumbar radiculopathy  Vital signs    Verify informed consent    Notify physician     Notify physician     Notify physician (specify)    Diet NPO    Case Request Operating Room: Injection-steroid-epidural-lumbar L5/S1    Place in Outpatient    alprazolam ODT dissolvable tablet 1 mg      2. DDD (degenerative disc disease), lumbar        3. DDD (degenerative disc disease), cervical        4. Cervical radiculopathy            Plan:  1. Unfortunately she did not have relief following the diagnostic medial branch nerve blocks so we will not repeat the blocks or proceed with radiofrequency ablation.  We discussed considering another cervical epidural steroid injection in the future because the results may have been affected from the significant amount of work she did after the injection and a fall that she had.  2. We reviewed her lumbar spine MRI together and she does have a mild disc bulge at L5/S1 that is close to contacting the descending S1 nerves.  Since she is having what seemed to be S1 symptoms  on the left, I am going to schedule her for an L5/S1 interlaminar epidural steroid injection.  3. She will follow-up 2 weeks after the injection or sooner as needed.

## 2022-12-01 ENCOUNTER — HOSPITAL ENCOUNTER (OUTPATIENT)
Facility: HOSPITAL | Age: 52
Discharge: HOME | End: 2022-12-01
Attending: ANESTHESIOLOGY | Admitting: ANESTHESIOLOGY
Payer: COMMERCIAL

## 2022-12-01 ENCOUNTER — HOSPITAL ENCOUNTER (OUTPATIENT)
Dept: RADIOLOGY | Facility: HOSPITAL | Age: 52
Discharge: HOME | End: 2022-12-01
Attending: ANESTHESIOLOGY | Admitting: ANESTHESIOLOGY
Payer: COMMERCIAL

## 2022-12-01 DIAGNOSIS — M54.50 LOWER BACK PAIN: ICD-10-CM

## 2022-12-01 DIAGNOSIS — M54.16 LUMBAR RADICULOPATHY: ICD-10-CM

## 2022-12-01 PROCEDURE — 25500020 PHARM REV CODE 255: Mod: PO | Performed by: ANESTHESIOLOGY

## 2022-12-01 PROCEDURE — 63600175 PHARM REV CODE 636 W HCPCS: Mod: PO | Performed by: ANESTHESIOLOGY

## 2022-12-01 PROCEDURE — 62323 NJX INTERLAMINAR LMBR/SAC: CPT | Mod: ,,, | Performed by: ANESTHESIOLOGY

## 2022-12-01 PROCEDURE — 62323 NJX INTERLAMINAR LMBR/SAC: CPT | Mod: PO | Performed by: ANESTHESIOLOGY

## 2022-12-01 PROCEDURE — 62323 PR INJ LUMBAR/SACRAL, W/IMAGING GUIDANCE: ICD-10-PCS | Mod: ,,, | Performed by: ANESTHESIOLOGY

## 2022-12-01 PROCEDURE — 25000003 PHARM REV CODE 250: Mod: PO | Performed by: ANESTHESIOLOGY

## 2022-12-01 PROCEDURE — 76000 FLUOROSCOPY <1 HR PHYS/QHP: CPT | Mod: TC,PO

## 2022-12-01 PROCEDURE — A4216 STERILE WATER/SALINE, 10 ML: HCPCS | Mod: PO | Performed by: ANESTHESIOLOGY

## 2022-12-01 RX ORDER — ALPRAZOLAM 0.5 MG/1
1 TABLET, ORALLY DISINTEGRATING ORAL ONCE AS NEEDED
Status: DISCONTINUED | OUTPATIENT
Start: 2022-12-01 | End: 2022-12-01 | Stop reason: HOSPADM

## 2022-12-01 RX ORDER — KETOROLAC TROMETHAMINE 10 MG/1
10 TABLET, FILM COATED ORAL DAILY PRN
Qty: 20 TABLET | Refills: 0 | Status: SHIPPED | OUTPATIENT
Start: 2022-12-01 | End: 2023-01-18

## 2022-12-01 RX ORDER — METHYLPREDNISOLONE ACETATE 80 MG/ML
INJECTION, SUSPENSION INTRA-ARTICULAR; INTRALESIONAL; INTRAMUSCULAR; SOFT TISSUE
Status: DISCONTINUED | OUTPATIENT
Start: 2022-12-01 | End: 2022-12-01 | Stop reason: HOSPADM

## 2022-12-01 RX ORDER — SODIUM CHLORIDE 9 MG/ML
INJECTION, SOLUTION INTRAMUSCULAR; INTRAVENOUS; SUBCUTANEOUS
Status: DISCONTINUED | OUTPATIENT
Start: 2022-12-01 | End: 2022-12-01 | Stop reason: HOSPADM

## 2022-12-01 RX ORDER — LIDOCAINE HYDROCHLORIDE 10 MG/ML
INJECTION, SOLUTION EPIDURAL; INFILTRATION; INTRACAUDAL; PERINEURAL
Status: DISCONTINUED | OUTPATIENT
Start: 2022-12-01 | End: 2022-12-01 | Stop reason: HOSPADM

## 2022-12-01 NOTE — OP NOTE

## 2022-12-01 NOTE — DISCHARGE SUMMARY
[facility] - Surgery  Discharge Note  Short Stay    Procedure(s) (LRB):  Injection-steroid-epidural-lumbar L5/S1 (N/A)      OUTCOME: Patient tolerated treatment/procedure well without complication and is now ready for discharge.    DISPOSITION: Home or Self Care    FINAL DIAGNOSIS:  Lumbar radiculopathy    FOLLOWUP: In clinic    DISCHARGE INSTRUCTIONS:    Discharge Procedure Orders   Diet Adult Regular     No dressing needed     Notify your health care provider if you experience any of the following:  temperature >100.4     Activity as tolerated

## 2022-12-02 VITALS
BODY MASS INDEX: 31.34 KG/M2 | HEART RATE: 61 BPM | SYSTOLIC BLOOD PRESSURE: 114 MMHG | DIASTOLIC BLOOD PRESSURE: 59 MMHG | TEMPERATURE: 98 F | RESPIRATION RATE: 16 BRPM | WEIGHT: 195 LBS | OXYGEN SATURATION: 100 % | HEIGHT: 66 IN

## 2022-12-14 ENCOUNTER — OFFICE VISIT (OUTPATIENT)
Dept: PAIN MEDICINE | Facility: CLINIC | Age: 52
End: 2022-12-14
Payer: COMMERCIAL

## 2022-12-14 VITALS
WEIGHT: 190.13 LBS | HEIGHT: 66 IN | DIASTOLIC BLOOD PRESSURE: 63 MMHG | SYSTOLIC BLOOD PRESSURE: 131 MMHG | BODY MASS INDEX: 30.56 KG/M2 | HEART RATE: 68 BPM | OXYGEN SATURATION: 98 %

## 2022-12-14 DIAGNOSIS — M50.30 DDD (DEGENERATIVE DISC DISEASE), CERVICAL: ICD-10-CM

## 2022-12-14 DIAGNOSIS — M54.16 LUMBAR RADICULOPATHY: ICD-10-CM

## 2022-12-14 DIAGNOSIS — M51.36 DDD (DEGENERATIVE DISC DISEASE), LUMBAR: ICD-10-CM

## 2022-12-14 DIAGNOSIS — M54.12 CERVICAL RADICULOPATHY: Primary | ICD-10-CM

## 2022-12-14 PROCEDURE — 1160F PR REVIEW ALL MEDS BY PRESCRIBER/CLIN PHARMACIST DOCUMENTED: ICD-10-PCS | Mod: CPTII,S$GLB,, | Performed by: PHYSICIAN ASSISTANT

## 2022-12-14 PROCEDURE — 99214 OFFICE O/P EST MOD 30 MIN: CPT | Mod: S$GLB,,, | Performed by: PHYSICIAN ASSISTANT

## 2022-12-14 PROCEDURE — 99214 PR OFFICE/OUTPT VISIT, EST, LEVL IV, 30-39 MIN: ICD-10-PCS | Mod: S$GLB,,, | Performed by: PHYSICIAN ASSISTANT

## 2022-12-14 PROCEDURE — 3008F PR BODY MASS INDEX (BMI) DOCUMENTED: ICD-10-PCS | Mod: CPTII,S$GLB,, | Performed by: PHYSICIAN ASSISTANT

## 2022-12-14 PROCEDURE — 1160F RVW MEDS BY RX/DR IN RCRD: CPT | Mod: CPTII,S$GLB,, | Performed by: PHYSICIAN ASSISTANT

## 2022-12-14 PROCEDURE — 3075F SYST BP GE 130 - 139MM HG: CPT | Mod: CPTII,S$GLB,, | Performed by: PHYSICIAN ASSISTANT

## 2022-12-14 PROCEDURE — 3078F PR MOST RECENT DIASTOLIC BLOOD PRESSURE < 80 MM HG: ICD-10-PCS | Mod: CPTII,S$GLB,, | Performed by: PHYSICIAN ASSISTANT

## 2022-12-14 PROCEDURE — 1159F MED LIST DOCD IN RCRD: CPT | Mod: CPTII,S$GLB,, | Performed by: PHYSICIAN ASSISTANT

## 2022-12-14 PROCEDURE — 3008F BODY MASS INDEX DOCD: CPT | Mod: CPTII,S$GLB,, | Performed by: PHYSICIAN ASSISTANT

## 2022-12-14 PROCEDURE — 99999 PR PBB SHADOW E&M-EST. PATIENT-LVL V: ICD-10-PCS | Mod: PBBFAC,,, | Performed by: PHYSICIAN ASSISTANT

## 2022-12-14 PROCEDURE — 99999 PR PBB SHADOW E&M-EST. PATIENT-LVL V: CPT | Mod: PBBFAC,,, | Performed by: PHYSICIAN ASSISTANT

## 2022-12-14 PROCEDURE — 1159F PR MEDICATION LIST DOCUMENTED IN MEDICAL RECORD: ICD-10-PCS | Mod: CPTII,S$GLB,, | Performed by: PHYSICIAN ASSISTANT

## 2022-12-14 PROCEDURE — 3078F DIAST BP <80 MM HG: CPT | Mod: CPTII,S$GLB,, | Performed by: PHYSICIAN ASSISTANT

## 2022-12-14 PROCEDURE — 3075F PR MOST RECENT SYSTOLIC BLOOD PRESS GE 130-139MM HG: ICD-10-PCS | Mod: CPTII,S$GLB,, | Performed by: PHYSICIAN ASSISTANT

## 2022-12-14 RX ORDER — SODIUM CHLORIDE, SODIUM LACTATE, POTASSIUM CHLORIDE, CALCIUM CHLORIDE 600; 310; 30; 20 MG/100ML; MG/100ML; MG/100ML; MG/100ML
INJECTION, SOLUTION INTRAVENOUS CONTINUOUS
Status: CANCELLED | OUTPATIENT
Start: 2022-12-14

## 2022-12-14 NOTE — PROGRESS NOTES
This note was completed with dictation software and grammatical errors may exist.    Chief Complaint   Patient presents with    Post-op Evaluation    Neck Pain    Back Pain        HPI: Li Singh is a 52 y.o. year old female patient who has a past medical history of Anxiety, Gastric ulcer, Hyperlipidemia, Hypertension, and Sleep apnea. She presents in referral from No ref. provider found for neck pain.  She is status post L5/S1 interlaminar epidural steroid injection on 12/01/2022 with greater than 60% relief.  She does feel that her low back pain is now tolerable.  Her main complaint is bilateral neck pain radiating to the upper back, trapezius muscles and also causing intermittent right hand numbness and pain.  This is located in the 1st 3 digits.  She states that if she tilts her head to the left her right hand will start hurting and go numb.  This can become severe at night while she is sleeping and it will wake her up.  She denies any specific weakness.    She is status post bilateral C4/5 and C5/6 diagnostic medial branch nerve blocks on 10/28/2022 with 0% relief.  She continues to have left greater than right neck pain radiating to the trapezius muscles.  She is reporting intermittent numbness down her right arm and into her right thumb.  She also complains of bilateral low back pain radiating to the left lateral buttock and left lateral foot greater than the bottom of her right heel.  She denies any numbness in her legs.  Her pain is worse with activity and improved with rest.  She denies any specific weakness but reports she is not as strong as she used to be.    Previous history:  The patient reports that she has had neck pain for about the last 10 years, denies any specific trauma that may have caused this.  Is located in her bilateral neck at the base of her neck out into the bilateral  numbness and tingling in the right arm especially when tilting her head to the left and gets numbness ,  and tingling  in the 1st through 3rd fingers.  The pain also radiates into the trapezius sometimes into the clavicle and down the middle of her spine through the shoulder blades.   She reports that at time she gets headaches in the occipital region that advanced into the front of her head at times as well.   She denies any balance issues, denies any regan weakness.    Pain intervention history:  She apparently has undergone injections in her neck with no relief. She is status post C7-T1 interlaminar epidural steroid injection on 09/22/2022 with 30% relief, reports that falling and using the tractor after the injection may have affected the results. She is status post bilateral C4/5 and C5/6 diagnostic medial branch nerve blocks on 10/28/2022 with 0% relief. She is status post L5/S1 interlaminar epidural steroid injection on 12/01/2022 with greater than 60% relief.      Spine surgeries:  The patient states that she had seen Dr. Al in the past who had recommended surgery    Antineuropathics:  Gabapentin 600 3 times daily  NSAIDs:  Physical therapy:  She is done physical therapy in the past, chiropractic care with some benefit.  She states that she was seeing a chiropractor at least once a week but was not able to do this for 3 months and states that her pain greatly worsened during that time.  Antidepressants:  BuSpar 5 mg, Wellbutrin 150 mg  Muscle relaxers:  Xanax 1 mg, Soma  Opioids:  Antiplatelets/Anticoagulants:  Aspirin 81    ROS:  She reports headaches, stomach ulcer, easy bruising, joint stiffness, back pain, memory loss, dizziness, difficulty sleeping.  Balance of review of systems negative.    No results found for: LABA1C, HGBA1C    No results found for: WBC, HGB, HCT, MCV, PLT    Past Medical History:   Diagnosis Date    Anxiety     Gastric ulcer     Hyperlipidemia     Hypertension     Sleep apnea        Past Surgical History:   Procedure Laterality Date    EPIDURAL STEROID INJECTION INTO CERVICAL SPINE N/A 9/22/2022  "   Procedure: Injection-steroid-epidural-cervical;  Surgeon: Ronald Bernabe MD;  Location: [facility] OR;  Service: Pain Management;  Laterality: N/A;    EPIDURAL STEROID INJECTION INTO LUMBAR SPINE N/A 12/1/2022    Procedure: Injection-steroid-epidural-lumbar L5/S1;  Surgeon: Ronald Bernabe MD;  Location: [facility] OR;  Service: Pain Management;  Laterality: N/A;    INJECTION OF ANESTHETIC AGENT AROUND MEDIAL BRANCH NERVES INNERVATING CERVICAL FACET JOINT Bilateral 10/28/2022    Procedure: Block-nerve-medial branch-cervical C4/5 and C5/6;  Surgeon: Ronald Bernabe MD;  Location: [facility] OR;  Service: Pain Management;  Laterality: Bilateral;       Social History     Socioeconomic History    Marital status:    Tobacco Use    Smoking status: Never    Smokeless tobacco: Never   Substance and Sexual Activity    Alcohol use: Not Currently    Drug use: Not Currently         Medications/Allergies: See med card    Vitals:    12/14/22 0830   BP: 131/63   Pulse: 68   SpO2: 98%   Weight: 86.3 kg (190 lb 2.4 oz)   Height: 5' 6" (1.676 m)   PainSc:   4   PainLoc: Neck     Body mass index is 30.69 kg/m².    Physical exam:  Gen: A and O x3, pleasant, well-groomed  Skin: No rashes or obvious lesions  HEENT: PERRLA, no obvious deformities on ears or in canals.Trachea midline.  CVS: Regular rate and rhythm, normal palpable pulses.  Resp: Clear to auscultation bilaterally, no wheezes or rales.  Abdomen: Soft, NT/ND.  Musculoskeletal: Able to heel walk, toe walk. No antalgic gait.     Neuro:  Motor:    Right Left   C4 Shoulder Abduction  5  5   C5 Elbow Flexion    5  5   C6 Wrist Extension  5  5   C7 Elbow Extension   5  5   C8/T1 Hand Intrinsics   5  5   C8 First Dorsal Interosseus  5  5   C8 Abductor Pollicus Brevis  5  5       Iliopsoas Quadriceps Knee  Flexion Tibialis  anterior Gastro- cnemius EHL   Lower: R 5/5 5/5 5/5 5/5 5/5 5/5    L 5/5 5/5 5/5 5/5 5/5 5/5        Left  Right    Triceps DTR 2+ 2+   Biceps DTR 2+ 2+ "   Brachioradialis DTR 2+ 2+   Patellar DTR 1+ 1+   Achilles DTR 1+ 1+   Lucio Absent  Absent   Clonus Absent Absent          Sensory: Intact and symmetrical to light touch and pinprick in C2-T1 dermatomes bilaterally. Intact and symmetrical to light touch and pinprick in L1-S1 dermatomes bilaterally.    Cervical spine: ROM is full in flexion, extension and lateral rotation with increased pain on extension greater than flexion, lateral rotation increased pain to the left greater than right  Spurling's maneuver causes neck pain to either side, left greater than right..  Myofascial exam: No Tenderness to palpation across cervical paraspinous region bilaterally.    Lumbar spine: ROM is full with flexion extension and oblique extension with no increased pain.    Talib's test causes no increased pain on either side.    Supine straight leg raise is negative bilaterally.    Internal and external rotation of the hip causes no increased pain on either side.  Myofascial exam: No tenderness to palpation across lumbar paraspinous muscles.    Imagin22 MRI C-spine:  Alignment: Reversal of the normal cervical lordosis centered at C4.  Minimal anterolisthesis of C3 on C4, 2 mm.  Minimal retrolisthesis of C4 on C5 and C5 on C6, 1-2 mm.   Vertebral Column: Vertebral body heights are maintained.  No evidence of an acute fracture or aggressive marrow replacement process. Probable small hemangiomas within the C4 C7 vertebral bodies.  Moderate disc degeneration at C4-5 and C5-6 with moderate intervertebral disc space narrowing, degenerative endplate change and marginal osteophyte formation.   Cord: Mild flattening of the left ventral aspect of the cord at C4-5.  No focal cord signal abnormality.   Skull base and craniocervical junction: Normal.   Degenerative findings:   C2-C3: The disc is normal in configuration. Mild left facet arthropathy.  There is no neural foraminal stenosis.  There is no spinal canal stenosis.   C3-C4:  Mild posterior disc osteophyte complex, which mildly effaces the ventral thecal sac.  Mild left facet arthropathy.  Mild left neural foraminal stenosis.  There is no spinal canal stenosis.   C4-C5: Left asymmetric posterior disc osteophyte complex, which mildly effaces the ventral thecal sac and flattens the left ventral aspect of the cord without significant cord compression or focal cord signal abnormality.  Mild bilateral facet arthropathy.  Moderate left uncovertebral joint spurring.  Moderate left neural foraminal stenosis.  There is no spinal canal stenosis.   C5-C6: Posterior disc osteophyte complex.  Mild bilateral facet arthropathy.  Marked right and moderate left uncovertebral joint spurring.  Severe right and moderate left neural foraminal stenosis.  Ligamentum flavum thickening.  Mild spinal canal stenosis.   C6-C7: Mild posterior disc osteophyte complex.  Mild bilateral facet arthropathy.  Mild left uncovertebral joint spurring.  Mild left neural foraminal stenosis.  There is no spinal canal stenosis.   C7-T1: The disc is normal in configuration.  Mild bilateral facet arthropathy and uncovertebral joint spurring.  Mild bilateral neural foraminal stenosis.  There is no spinal canal stenosis.    10/31/2022 MRI lumbar spine   Alignment: Mild dextroconvex curvature of lower lumbar spine and levoconvex curvature of the thoracolumbar junction.  Mild grade 1 anterolisthesis of L4 on L5 and L5 on S1.     Vertebral column: Vertebral body heights are maintained.  No evidence of an acute fracture or aggressive marrow replacement process. Marked right asymmetric disc space narrowing at T11-12 with mixed degenerative endplate change and marginal osteophyte formation.  Multilevel disc degeneration throughout the lumbar spine with mild-to-moderate disc space narrowing and disc bulges, most pronounced at L1-2, L3-4 and L5-S1.     Cord: Normal.  Conus terminates at L1.     Degenerative findings:     T11-12: Right  asymmetric diffuse disc bulge with osteophytic ridging.  Mild bilateral facet arthropathy and ligamentum flavum thickening.  Moderate right neural foraminal stenosis.  Mild spinal canal stenosis.     T12-L1: No significant disc abnormality.  Mild bilateral facet arthropathy ligamentum flavum thickening.  No neural foraminal or spinal canal stenosis.     L1-L2: Diffuse disc bulge with osteophytic ridging.  Mild bilateral facet arthropathy ligamentum flavum thickening.  Mild bilateral neural foraminal stenosis.  There is no spinal canal stenosis.     L2-L3: Mild diffuse disc bulge with osteophytic ridging.  Mild bilateral facet arthropathy ligamentum flavum thickening.  Mild left neural foraminal stenosis.  There is no spinal canal stenosis.     L3-L4: Diffuse disc bulge with osteophytic ridging.  Moderate bilateral facet arthropathy.  Ligamentum flavum thickening.  Mild-to-moderate left and mild right neural foraminal stenosis.  Mild spinal canal stenosis.     L4-L5: Mild diffuse disc bulge.  Marked left greater than right facet arthropathy with left-sided facet effusion.  Ligamentum flavum thickening.  Mild bilateral neural foraminal stenosis.  Mild spinal canal stenosis.     L5-S1: Diffuse disc bulge with osteophytic ridging and superimposed right central/subarticular disc protrusion through an annular fissure.  Marked right greater than left facet arthropathy with facet effusions.  Mild-to-moderate right and mild left neural foraminal stenosis.  Mild spinal canal stenosis.     Paraspinal muscles & soft tissues: No significant abnormalities.      Assessment:   Li Singh is a 52 y.o. year old female patient who has a past medical history of Anxiety, Gastric ulcer, Hyperlipidemia, and Hypertension. She presents in referral from Shikha Steen for neck pain.     1. Cervical radiculopathy  Vital signs    Place 18-22 [facility]     Verify informed consent    Notify physician     Notify physician      Notify physician (specify)    Diet NPO    Case Request Operating Room: Injection-steroid-epidural-cervical to right    Place in Outpatient    lactated ringers infusion      2. DDD (degenerative disc disease), cervical        3. DDD (degenerative disc disease), lumbar        4. Lumbar radiculopathy            Plan:  1. The patient did well following the L5/S1 interlaminar epidural steroid injection.  This can be repeated in the future if necessary.    2. I will schedule the patient to repeat a C7-T1 interlaminar epidural steroid injection.  We discussed that her last cervical epidural steroid injection did not likely last due to the work she was doing and the fall following the injection.    3. Follow-up in 4 weeks postprocedure or sooner as needed.

## 2022-12-14 NOTE — H&P (VIEW-ONLY)
This note was completed with dictation software and grammatical errors may exist.    Chief Complaint   Patient presents with    Post-op Evaluation    Neck Pain    Back Pain        HPI: Li Singh is a 52 y.o. year old female patient who has a past medical history of Anxiety, Gastric ulcer, Hyperlipidemia, Hypertension, and Sleep apnea. She presents in referral from No ref. provider found for neck pain.  She is status post L5/S1 interlaminar epidural steroid injection on 12/01/2022 with greater than 60% relief.  She does feel that her low back pain is now tolerable.  Her main complaint is bilateral neck pain radiating to the upper back, trapezius muscles and also causing intermittent right hand numbness and pain.  This is located in the 1st 3 digits.  She states that if she tilts her head to the left her right hand will start hurting and go numb.  This can become severe at night while she is sleeping and it will wake her up.  She denies any specific weakness.    She is status post bilateral C4/5 and C5/6 diagnostic medial branch nerve blocks on 10/28/2022 with 0% relief.  She continues to have left greater than right neck pain radiating to the trapezius muscles.  She is reporting intermittent numbness down her right arm and into her right thumb.  She also complains of bilateral low back pain radiating to the left lateral buttock and left lateral foot greater than the bottom of her right heel.  She denies any numbness in her legs.  Her pain is worse with activity and improved with rest.  She denies any specific weakness but reports she is not as strong as she used to be.    Previous history:  The patient reports that she has had neck pain for about the last 10 years, denies any specific trauma that may have caused this.  Is located in her bilateral neck at the base of her neck out into the bilateral  numbness and tingling in the right arm especially when tilting her head to the left and gets numbness ,  and tingling  in the 1st through 3rd fingers.  The pain also radiates into the trapezius sometimes into the clavicle and down the middle of her spine through the shoulder blades.   She reports that at time she gets headaches in the occipital region that advanced into the front of her head at times as well.   She denies any balance issues, denies any regan weakness.    Pain intervention history:  She apparently has undergone injections in her neck with no relief. She is status post C7-T1 interlaminar epidural steroid injection on 09/22/2022 with 30% relief, reports that falling and using the tractor after the injection may have affected the results. She is status post bilateral C4/5 and C5/6 diagnostic medial branch nerve blocks on 10/28/2022 with 0% relief. She is status post L5/S1 interlaminar epidural steroid injection on 12/01/2022 with greater than 60% relief.      Spine surgeries:  The patient states that she had seen Dr. Al in the past who had recommended surgery    Antineuropathics:  Gabapentin 600 3 times daily  NSAIDs:  Physical therapy:  She is done physical therapy in the past, chiropractic care with some benefit.  She states that she was seeing a chiropractor at least once a week but was not able to do this for 3 months and states that her pain greatly worsened during that time.  Antidepressants:  BuSpar 5 mg, Wellbutrin 150 mg  Muscle relaxers:  Xanax 1 mg, Soma  Opioids:  Antiplatelets/Anticoagulants:  Aspirin 81    ROS:  She reports headaches, stomach ulcer, easy bruising, joint stiffness, back pain, memory loss, dizziness, difficulty sleeping.  Balance of review of systems negative.    No results found for: LABA1C, HGBA1C    No results found for: WBC, HGB, HCT, MCV, PLT    Past Medical History:   Diagnosis Date    Anxiety     Gastric ulcer     Hyperlipidemia     Hypertension     Sleep apnea        Past Surgical History:   Procedure Laterality Date    EPIDURAL STEROID INJECTION INTO CERVICAL SPINE N/A 9/22/2022  "   Procedure: Injection-steroid-epidural-cervical;  Surgeon: Ronald Bernabe MD;  Location: [facility] OR;  Service: Pain Management;  Laterality: N/A;    EPIDURAL STEROID INJECTION INTO LUMBAR SPINE N/A 12/1/2022    Procedure: Injection-steroid-epidural-lumbar L5/S1;  Surgeon: Ronald Bernabe MD;  Location: [facility] OR;  Service: Pain Management;  Laterality: N/A;    INJECTION OF ANESTHETIC AGENT AROUND MEDIAL BRANCH NERVES INNERVATING CERVICAL FACET JOINT Bilateral 10/28/2022    Procedure: Block-nerve-medial branch-cervical C4/5 and C5/6;  Surgeon: Ronald Bernabe MD;  Location: [facility] OR;  Service: Pain Management;  Laterality: Bilateral;       Social History     Socioeconomic History    Marital status:    Tobacco Use    Smoking status: Never    Smokeless tobacco: Never   Substance and Sexual Activity    Alcohol use: Not Currently    Drug use: Not Currently         Medications/Allergies: See med card    Vitals:    12/14/22 0830   BP: 131/63   Pulse: 68   SpO2: 98%   Weight: 86.3 kg (190 lb 2.4 oz)   Height: 5' 6" (1.676 m)   PainSc:   4   PainLoc: Neck     Body mass index is 30.69 kg/m².    Physical exam:  Gen: A and O x3, pleasant, well-groomed  Skin: No rashes or obvious lesions  HEENT: PERRLA, no obvious deformities on ears or in canals.Trachea midline.  CVS: Regular rate and rhythm, normal palpable pulses.  Resp: Clear to auscultation bilaterally, no wheezes or rales.  Abdomen: Soft, NT/ND.  Musculoskeletal: Able to heel walk, toe walk. No antalgic gait.     Neuro:  Motor:    Right Left   C4 Shoulder Abduction  5  5   C5 Elbow Flexion    5  5   C6 Wrist Extension  5  5   C7 Elbow Extension   5  5   C8/T1 Hand Intrinsics   5  5   C8 First Dorsal Interosseus  5  5   C8 Abductor Pollicus Brevis  5  5       Iliopsoas Quadriceps Knee  Flexion Tibialis  anterior Gastro- cnemius EHL   Lower: R 5/5 5/5 5/5 5/5 5/5 5/5    L 5/5 5/5 5/5 5/5 5/5 5/5        Left  Right    Triceps DTR 2+ 2+   Biceps DTR 2+ 2+ "   Brachioradialis DTR 2+ 2+   Patellar DTR 1+ 1+   Achilles DTR 1+ 1+   Lucio Absent  Absent   Clonus Absent Absent          Sensory: Intact and symmetrical to light touch and pinprick in C2-T1 dermatomes bilaterally. Intact and symmetrical to light touch and pinprick in L1-S1 dermatomes bilaterally.    Cervical spine: ROM is full in flexion, extension and lateral rotation with increased pain on extension greater than flexion, lateral rotation increased pain to the left greater than right  Spurling's maneuver causes neck pain to either side, left greater than right..  Myofascial exam: No Tenderness to palpation across cervical paraspinous region bilaterally.    Lumbar spine: ROM is full with flexion extension and oblique extension with no increased pain.    Tlaib's test causes no increased pain on either side.    Supine straight leg raise is negative bilaterally.    Internal and external rotation of the hip causes no increased pain on either side.  Myofascial exam: No tenderness to palpation across lumbar paraspinous muscles.    Imagin22 MRI C-spine:  Alignment: Reversal of the normal cervical lordosis centered at C4.  Minimal anterolisthesis of C3 on C4, 2 mm.  Minimal retrolisthesis of C4 on C5 and C5 on C6, 1-2 mm.   Vertebral Column: Vertebral body heights are maintained.  No evidence of an acute fracture or aggressive marrow replacement process. Probable small hemangiomas within the C4 C7 vertebral bodies.  Moderate disc degeneration at C4-5 and C5-6 with moderate intervertebral disc space narrowing, degenerative endplate change and marginal osteophyte formation.   Cord: Mild flattening of the left ventral aspect of the cord at C4-5.  No focal cord signal abnormality.   Skull base and craniocervical junction: Normal.   Degenerative findings:   C2-C3: The disc is normal in configuration. Mild left facet arthropathy.  There is no neural foraminal stenosis.  There is no spinal canal stenosis.   C3-C4:  Mild posterior disc osteophyte complex, which mildly effaces the ventral thecal sac.  Mild left facet arthropathy.  Mild left neural foraminal stenosis.  There is no spinal canal stenosis.   C4-C5: Left asymmetric posterior disc osteophyte complex, which mildly effaces the ventral thecal sac and flattens the left ventral aspect of the cord without significant cord compression or focal cord signal abnormality.  Mild bilateral facet arthropathy.  Moderate left uncovertebral joint spurring.  Moderate left neural foraminal stenosis.  There is no spinal canal stenosis.   C5-C6: Posterior disc osteophyte complex.  Mild bilateral facet arthropathy.  Marked right and moderate left uncovertebral joint spurring.  Severe right and moderate left neural foraminal stenosis.  Ligamentum flavum thickening.  Mild spinal canal stenosis.   C6-C7: Mild posterior disc osteophyte complex.  Mild bilateral facet arthropathy.  Mild left uncovertebral joint spurring.  Mild left neural foraminal stenosis.  There is no spinal canal stenosis.   C7-T1: The disc is normal in configuration.  Mild bilateral facet arthropathy and uncovertebral joint spurring.  Mild bilateral neural foraminal stenosis.  There is no spinal canal stenosis.    10/31/2022 MRI lumbar spine   Alignment: Mild dextroconvex curvature of lower lumbar spine and levoconvex curvature of the thoracolumbar junction.  Mild grade 1 anterolisthesis of L4 on L5 and L5 on S1.     Vertebral column: Vertebral body heights are maintained.  No evidence of an acute fracture or aggressive marrow replacement process. Marked right asymmetric disc space narrowing at T11-12 with mixed degenerative endplate change and marginal osteophyte formation.  Multilevel disc degeneration throughout the lumbar spine with mild-to-moderate disc space narrowing and disc bulges, most pronounced at L1-2, L3-4 and L5-S1.     Cord: Normal.  Conus terminates at L1.     Degenerative findings:     T11-12: Right  asymmetric diffuse disc bulge with osteophytic ridging.  Mild bilateral facet arthropathy and ligamentum flavum thickening.  Moderate right neural foraminal stenosis.  Mild spinal canal stenosis.     T12-L1: No significant disc abnormality.  Mild bilateral facet arthropathy ligamentum flavum thickening.  No neural foraminal or spinal canal stenosis.     L1-L2: Diffuse disc bulge with osteophytic ridging.  Mild bilateral facet arthropathy ligamentum flavum thickening.  Mild bilateral neural foraminal stenosis.  There is no spinal canal stenosis.     L2-L3: Mild diffuse disc bulge with osteophytic ridging.  Mild bilateral facet arthropathy ligamentum flavum thickening.  Mild left neural foraminal stenosis.  There is no spinal canal stenosis.     L3-L4: Diffuse disc bulge with osteophytic ridging.  Moderate bilateral facet arthropathy.  Ligamentum flavum thickening.  Mild-to-moderate left and mild right neural foraminal stenosis.  Mild spinal canal stenosis.     L4-L5: Mild diffuse disc bulge.  Marked left greater than right facet arthropathy with left-sided facet effusion.  Ligamentum flavum thickening.  Mild bilateral neural foraminal stenosis.  Mild spinal canal stenosis.     L5-S1: Diffuse disc bulge with osteophytic ridging and superimposed right central/subarticular disc protrusion through an annular fissure.  Marked right greater than left facet arthropathy with facet effusions.  Mild-to-moderate right and mild left neural foraminal stenosis.  Mild spinal canal stenosis.     Paraspinal muscles & soft tissues: No significant abnormalities.      Assessment:   Li Singh is a 52 y.o. year old female patient who has a past medical history of Anxiety, Gastric ulcer, Hyperlipidemia, and Hypertension. She presents in referral from Shikha Steen for neck pain.     1. Cervical radiculopathy  Vital signs    Place 18-22 [facility]     Verify informed consent    Notify physician     Notify physician      Notify physician (specify)    Diet NPO    Case Request Operating Room: Injection-steroid-epidural-cervical to right    Place in Outpatient    lactated ringers infusion      2. DDD (degenerative disc disease), cervical        3. DDD (degenerative disc disease), lumbar        4. Lumbar radiculopathy            Plan:  1. The patient did well following the L5/S1 interlaminar epidural steroid injection.  This can be repeated in the future if necessary.    2. I will schedule the patient to repeat a C7-T1 interlaminar epidural steroid injection.  We discussed that her last cervical epidural steroid injection did not likely last due to the work she was doing and the fall following the injection.    3. Follow-up in 4 weeks postprocedure or sooner as needed.

## 2022-12-30 ENCOUNTER — HOSPITAL ENCOUNTER (OUTPATIENT)
Dept: RADIOLOGY | Facility: HOSPITAL | Age: 52
Discharge: HOME | End: 2022-12-30
Attending: ANESTHESIOLOGY | Admitting: ANESTHESIOLOGY
Payer: COMMERCIAL

## 2022-12-30 ENCOUNTER — HOSPITAL ENCOUNTER (OUTPATIENT)
Facility: HOSPITAL | Age: 52
Discharge: HOME | End: 2022-12-30
Attending: ANESTHESIOLOGY | Admitting: ANESTHESIOLOGY
Payer: COMMERCIAL

## 2022-12-30 VITALS
SYSTOLIC BLOOD PRESSURE: 118 MMHG | HEART RATE: 77 BPM | DIASTOLIC BLOOD PRESSURE: 57 MMHG | RESPIRATION RATE: 16 BRPM | WEIGHT: 185 LBS | OXYGEN SATURATION: 99 % | BODY MASS INDEX: 29.73 KG/M2 | TEMPERATURE: 97 F | HEIGHT: 66 IN

## 2022-12-30 DIAGNOSIS — M54.12 CERVICAL RADICULOPATHY: ICD-10-CM

## 2022-12-30 DIAGNOSIS — M54.2 NECK PAIN: ICD-10-CM

## 2022-12-30 PROCEDURE — 62323 NJX INTERLAMINAR LMBR/SAC: CPT | Mod: PO | Performed by: ANESTHESIOLOGY

## 2022-12-30 PROCEDURE — 62321 NJX INTERLAMINAR CRV/THRC: CPT | Mod: PO | Performed by: ANESTHESIOLOGY

## 2022-12-30 PROCEDURE — 25500020 PHARM REV CODE 255: Mod: PO | Performed by: ANESTHESIOLOGY

## 2022-12-30 PROCEDURE — 62321 PR INJ CERV/THORAC, W/GUIDANCE: ICD-10-PCS | Mod: ,,, | Performed by: ANESTHESIOLOGY

## 2022-12-30 PROCEDURE — 76000 FLUOROSCOPY <1 HR PHYS/QHP: CPT | Mod: TC,PO

## 2022-12-30 PROCEDURE — 63600175 PHARM REV CODE 636 W HCPCS: Mod: PO | Performed by: ANESTHESIOLOGY

## 2022-12-30 PROCEDURE — 62321 NJX INTERLAMINAR CRV/THRC: CPT | Mod: ,,, | Performed by: ANESTHESIOLOGY

## 2022-12-30 PROCEDURE — 25000003 PHARM REV CODE 250: Mod: PO | Performed by: ANESTHESIOLOGY

## 2022-12-30 PROCEDURE — A4216 STERILE WATER/SALINE, 10 ML: HCPCS | Mod: PO | Performed by: ANESTHESIOLOGY

## 2022-12-30 RX ORDER — SODIUM CHLORIDE 9 MG/ML
INJECTION, SOLUTION INTRAMUSCULAR; INTRAVENOUS; SUBCUTANEOUS
Status: DISCONTINUED | OUTPATIENT
Start: 2022-12-30 | End: 2022-12-30 | Stop reason: HOSPADM

## 2022-12-30 RX ORDER — SODIUM CHLORIDE, SODIUM LACTATE, POTASSIUM CHLORIDE, CALCIUM CHLORIDE 600; 310; 30; 20 MG/100ML; MG/100ML; MG/100ML; MG/100ML
INJECTION, SOLUTION INTRAVENOUS CONTINUOUS
Status: DISCONTINUED | OUTPATIENT
Start: 2022-12-30 | End: 2022-12-30 | Stop reason: HOSPADM

## 2022-12-30 RX ORDER — METHYLPREDNISOLONE ACETATE 80 MG/ML
INJECTION, SUSPENSION INTRA-ARTICULAR; INTRALESIONAL; INTRAMUSCULAR; SOFT TISSUE
Status: DISCONTINUED | OUTPATIENT
Start: 2022-12-30 | End: 2022-12-30 | Stop reason: HOSPADM

## 2022-12-30 RX ORDER — MIDAZOLAM HYDROCHLORIDE 2 MG/2ML
INJECTION, SOLUTION INTRAMUSCULAR; INTRAVENOUS
Status: DISCONTINUED | OUTPATIENT
Start: 2022-12-30 | End: 2022-12-30 | Stop reason: HOSPADM

## 2022-12-30 RX ORDER — LIDOCAINE HYDROCHLORIDE 10 MG/ML
INJECTION, SOLUTION EPIDURAL; INFILTRATION; INTRACAUDAL; PERINEURAL
Status: DISCONTINUED | OUTPATIENT
Start: 2022-12-30 | End: 2022-12-30 | Stop reason: HOSPADM

## 2022-12-30 RX ADMIN — SODIUM CHLORIDE, POTASSIUM CHLORIDE, SODIUM LACTATE AND CALCIUM CHLORIDE: 600; 310; 30; 20 INJECTION, SOLUTION INTRAVENOUS at 02:12

## 2022-12-30 NOTE — OP NOTE
PROCEDURE DATE: 12/30/2022    Procedure: C7-T1 cervical interlaminar epidural steroid injection under utilizing fluoroscopy.    Diagnosis: Cervical Radiculopathy    POSTOP DIAGNOSIS: SAME    Physician: Ronald Bernabe MD    Medications injected:  Methylprednisone 80mg followed by a slow injection of 4 mL sterile, preservative-free normal saline.    Local anesthetic used: Lidocaine 1%, 4 ml.    Sedation Medications: 4mg versed    Complications:  none    Estimated blood loss: none    Technique:  A time-out was taken to identify patient and procedure prior to starting the procedure.  With the patient laying in a prone position with the neck in a mid-flexed forward position, the area was prepped and draped in the usual sterile fashion using ChloraPrep and a fenestrated drape.  The area was determined under AP fluoroscopic guidance.  Local anesthetic was given using a 25-gauge 1.5 inch needle by raising a wheal and then infiltrating ventrally.  A 3.5 inch 20-gauge Touhy needle was introduced under fluoroscopic guidance to meet the lamina of C7.  The needle was then hinged under the lamina then advanced using loss of resistance technique.  Once the tip of the needle was in the desired position, the contrast dye Omnipaque was injected to determine placement and no uptake.  The steroid was then injected slowly followed by a slow injection of 4 mL of the sterile preservative-free normal saline.  The patient tolerated the procedure well.    The patient was monitored after the procedure and was given post-procedure and discharge instructions to follow at home. The patient was discharged in a stable condition.    Event Time In   In Facility 1408   In Pre-Procedure 1422   Physician Available    Anesthesia Available    Pre-Op: Bedside Procedure Start    Pre-Op: Bedside Procedure Stop    Pre-Procedure Complete 1445   Out of Pre-Procedure    Anesthesia Start    Anesthesia Start Data Collection    Setup Start    Setup Complete     In Room 1504   Prep Start    Procedure Prep Complete    Procedure Start 1512   Procedure Closing    Emergence    Procedure Finish 1515   Sedation Start 1503   Scope In    Extent Reached    Scope Out    Sedation End 1517   Out of Room 1517   Cleanup Start    Cleanup Complete    Cosmetic Start    Cosmetic Stop    Pain Mgmt In Room    Pain Mgmt Out Room    In Recovery    Anesthesia Finish    Bedside Procedure Start    Bedside Procedure Stop    Recovery Care Complete    Out of Recovery    To Phase II    In Phase II    Pain Mgmt Recovery Start 1517   Pain Mgmt Recovery Stop 1532   Obs Rec Start    Obs Rec Stop    Phase II Care Complete    Out of Phase II    Procedural Care Complete 1532   Discharge    Pain Follow Up Needed    Pain Follow Up Complete      Moderate sedation was achieved with midazolam 4 mg.  Continuous monitoring of EKG, blood pressure and pulse oximetry was provided by a registered nurse during the entire course of the procedure under my supervision and recorded in the patient's medical record.   Total time for sedation was 14 minutes.

## 2022-12-30 NOTE — DISCHARGE SUMMARY
[place] [facility]  Discharge Note  Short Stay    Procedure(s) (LRB):  Injection-steroid-epidural-cervical to right (N/A)      OUTCOME: Patient tolerated treatment/procedure well without complication and is now ready for discharge.    DISPOSITION: Home or Self Care    FINAL DIAGNOSIS:  Cervical radiculopathy    FOLLOWUP: In clinic    DISCHARGE INSTRUCTIONS:    Discharge Procedure Orders   Diet Adult Regular     No dressing needed     Notify your health care provider if you experience any of the following:  temperature >100.4     Activity as tolerated

## 2023-01-31 ENCOUNTER — OFFICE VISIT (OUTPATIENT)
Dept: PAIN MEDICINE | Facility: CLINIC | Age: 53
End: 2023-01-31
Payer: COMMERCIAL

## 2023-01-31 ENCOUNTER — TELEPHONE (OUTPATIENT)
Dept: PAIN MEDICINE | Facility: CLINIC | Age: 53
End: 2023-01-31
Payer: COMMERCIAL

## 2023-01-31 VITALS
DIASTOLIC BLOOD PRESSURE: 66 MMHG | BODY MASS INDEX: 30.29 KG/M2 | WEIGHT: 188.5 LBS | HEIGHT: 66 IN | SYSTOLIC BLOOD PRESSURE: 116 MMHG | HEART RATE: 71 BPM

## 2023-01-31 DIAGNOSIS — M51.36 DDD (DEGENERATIVE DISC DISEASE), LUMBAR: ICD-10-CM

## 2023-01-31 DIAGNOSIS — M54.12 CERVICAL RADICULOPATHY: Primary | ICD-10-CM

## 2023-01-31 DIAGNOSIS — M50.30 DDD (DEGENERATIVE DISC DISEASE), CERVICAL: ICD-10-CM

## 2023-01-31 PROCEDURE — 3008F BODY MASS INDEX DOCD: CPT | Mod: CPTII,S$GLB,, | Performed by: PHYSICIAN ASSISTANT

## 2023-01-31 PROCEDURE — 99999 PR PBB SHADOW E&M-EST. PATIENT-LVL IV: ICD-10-PCS | Mod: PBBFAC,,, | Performed by: PHYSICIAN ASSISTANT

## 2023-01-31 PROCEDURE — 1160F RVW MEDS BY RX/DR IN RCRD: CPT | Mod: CPTII,S$GLB,, | Performed by: PHYSICIAN ASSISTANT

## 2023-01-31 PROCEDURE — 99999 PR PBB SHADOW E&M-EST. PATIENT-LVL IV: CPT | Mod: PBBFAC,,, | Performed by: PHYSICIAN ASSISTANT

## 2023-01-31 PROCEDURE — 1159F PR MEDICATION LIST DOCUMENTED IN MEDICAL RECORD: ICD-10-PCS | Mod: CPTII,S$GLB,, | Performed by: PHYSICIAN ASSISTANT

## 2023-01-31 PROCEDURE — 3078F DIAST BP <80 MM HG: CPT | Mod: CPTII,S$GLB,, | Performed by: PHYSICIAN ASSISTANT

## 2023-01-31 PROCEDURE — 3074F SYST BP LT 130 MM HG: CPT | Mod: CPTII,S$GLB,, | Performed by: PHYSICIAN ASSISTANT

## 2023-01-31 PROCEDURE — 3074F PR MOST RECENT SYSTOLIC BLOOD PRESSURE < 130 MM HG: ICD-10-PCS | Mod: CPTII,S$GLB,, | Performed by: PHYSICIAN ASSISTANT

## 2023-01-31 PROCEDURE — 3078F PR MOST RECENT DIASTOLIC BLOOD PRESSURE < 80 MM HG: ICD-10-PCS | Mod: CPTII,S$GLB,, | Performed by: PHYSICIAN ASSISTANT

## 2023-01-31 PROCEDURE — 99214 PR OFFICE/OUTPT VISIT, EST, LEVL IV, 30-39 MIN: ICD-10-PCS | Mod: S$GLB,,, | Performed by: PHYSICIAN ASSISTANT

## 2023-01-31 PROCEDURE — 1159F MED LIST DOCD IN RCRD: CPT | Mod: CPTII,S$GLB,, | Performed by: PHYSICIAN ASSISTANT

## 2023-01-31 PROCEDURE — 1160F PR REVIEW ALL MEDS BY PRESCRIBER/CLIN PHARMACIST DOCUMENTED: ICD-10-PCS | Mod: CPTII,S$GLB,, | Performed by: PHYSICIAN ASSISTANT

## 2023-01-31 PROCEDURE — 3008F PR BODY MASS INDEX (BMI) DOCUMENTED: ICD-10-PCS | Mod: CPTII,S$GLB,, | Performed by: PHYSICIAN ASSISTANT

## 2023-01-31 PROCEDURE — 99214 OFFICE O/P EST MOD 30 MIN: CPT | Mod: S$GLB,,, | Performed by: PHYSICIAN ASSISTANT

## 2023-01-31 RX ORDER — SODIUM CHLORIDE, SODIUM LACTATE, POTASSIUM CHLORIDE, CALCIUM CHLORIDE 600; 310; 30; 20 MG/100ML; MG/100ML; MG/100ML; MG/100ML
INJECTION, SOLUTION INTRAVENOUS CONTINUOUS
Status: CANCELLED | OUTPATIENT
Start: 2023-01-31

## 2023-01-31 RX ORDER — MUPIROCIN 20 MG/G
OINTMENT TOPICAL 2 TIMES DAILY
COMMUNITY
Start: 2023-01-26

## 2023-01-31 NOTE — PROGRESS NOTES
This note was completed with dictation software and grammatical errors may exist.    Chief Complaint   Patient presents with    Follow-up        HPI: Li Singh is a 52 y.o. year old female patient who has a past medical history of Anxiety, Gastric ulcer, Hyperlipidemia, Hypertension, and Sleep apnea. She presents in referral from No ref. provider found for neck pain.    She is status post C7-T1 interlaminar epidural steroid injection on 12/30/2022 with excellent relief lasting 3-4 weeks, now reporting mild relief.  She continues to have pain radiating from her neck down her right arm and into her right thumb.  She does state that the numbness and tingling is not as bad as it was prior to the injection and is only in her right thumb instead of the 1st 3 digits.  She continues to have some left buttock pain however reports ongoing relief from the lumbar epidural steroid injection last year.  She denies weakness or incontinence.    She is status post bilateral C4/5 and C5/6 diagnostic medial branch nerve blocks on 10/28/2022 with 0% relief.  She continues to have left greater than right neck pain radiating to the trapezius muscles.  She is reporting intermittent numbness down her right arm and into her right thumb.  She also complains of bilateral low back pain radiating to the left lateral buttock and left lateral foot greater than the bottom of her right heel.  She denies any numbness in her legs.  Her pain is worse with activity and improved with rest.  She denies any specific weakness but reports she is not as strong as she used to be.    Previous history:  The patient reports that she has had neck pain for about the last 10 years, denies any specific trauma that may have caused this.  Is located in her bilateral neck at the base of her neck out into the bilateral  numbness and tingling in the right arm especially when tilting her head to the left and gets numbness ,  and tingling in the 1st through 3rd fingers.   The pain also radiates into the trapezius sometimes into the clavicle and down the middle of her spine through the shoulder blades.   She reports that at time she gets headaches in the occipital region that advanced into the front of her head at times as well.   She denies any balance issues, denies any regan weakness.    Pain intervention history:  She apparently has undergone injections in her neck with no relief. She is status post C7-T1 interlaminar epidural steroid injection on 09/22/2022 with 30% relief, reports that falling and using the tractor after the injection may have affected the results. She is status post bilateral C4/5 and C5/6 diagnostic medial branch nerve blocks on 10/28/2022 with 0% relief. She is status post L5/S1 interlaminar epidural steroid injection on 12/01/2022 with greater than 60% relief.  She is status post C7-T1 interlaminar epidural steroid injection on 12/30/2022 with excellent relief lasting 3-4 weeks, now reporting mild relief.     Spine surgeries:  The patient states that she had seen Dr. Al in the past who had recommended surgery    Antineuropathics:  Gabapentin 600 3 times daily  NSAIDs:  Physical therapy:  She is done physical therapy in the past, chiropractic care with some benefit.  She states that she was seeing a chiropractor at least once a week but was not able to do this for 3 months and states that her pain greatly worsened during that time.  Antidepressants:  BuSpar 5 mg, Wellbutrin 150 mg  Muscle relaxers:  Xanax 1 mg, Soma  Opioids:  Antiplatelets/Anticoagulants:  Aspirin 81    ROS:  She reports headaches, stomach ulcer, easy bruising, joint stiffness, back pain, memory loss, dizziness, difficulty sleeping.  Balance of review of systems negative.    No results found for: LABA1C, HGBA1C    No results found for: WBC, HGB, HCT, MCV, PLT    Past Medical History:   Diagnosis Date    Anxiety     Gastric ulcer     Hyperlipidemia     Hypertension     Sleep apnea   "      Past Surgical History:   Procedure Laterality Date    EPIDURAL STEROID INJECTION INTO CERVICAL SPINE N/A 9/22/2022    Procedure: Injection-steroid-epidural-cervical;  Surgeon: Ronald Bernabe MD;  Location: [facility] OR;  Service: Pain Management;  Laterality: N/A;    EPIDURAL STEROID INJECTION INTO CERVICAL SPINE N/A 12/30/2022    Procedure: Injection-steroid-epidural-cervical to right;  Surgeon: Ronald Bernabe MD;  Location: [facility] OR;  Service: Pain Management;  Laterality: N/A;    EPIDURAL STEROID INJECTION INTO LUMBAR SPINE N/A 12/1/2022    Procedure: Injection-steroid-epidural-lumbar L5/S1;  Surgeon: Ronald Bernabe MD;  Location: [facility] OR;  Service: Pain Management;  Laterality: N/A;    INJECTION OF ANESTHETIC AGENT AROUND MEDIAL BRANCH NERVES INNERVATING CERVICAL FACET JOINT Bilateral 10/28/2022    Procedure: Block-nerve-medial branch-cervical C4/5 and C5/6;  Surgeon: Ronald Bernabe MD;  Location: [facility] OR;  Service: Pain Management;  Laterality: Bilateral;       Social History     Socioeconomic History    Marital status:    Tobacco Use    Smoking status: Never    Smokeless tobacco: Never   Substance and Sexual Activity    Alcohol use: Not Currently    Drug use: Not Currently         Medications/Allergies: See med card    Vitals:    01/31/23 0846   BP: 116/66   Pulse: 71   Weight: 85.5 kg (188 lb 7.9 oz)   Height: 5' 6" (1.676 m)   PainSc:   5   PainLoc: Neck     Body mass index is 30.42 kg/m².    Physical exam:  Gen: A and O x3, pleasant, well-groomed  Skin: No rashes or obvious lesions  HEENT: PERRLA, no obvious deformities on ears or in canals.Trachea midline.  CVS: Regular rate and rhythm, normal palpable pulses.  Resp: Clear to auscultation bilaterally, no wheezes or rales.  Abdomen: Soft, NT/ND.  Musculoskeletal: Able to heel walk, toe walk. No antalgic gait.     Neuro:  Motor:    Right Left   C4 Shoulder Abduction  5  5   C5 Elbow Flexion    5  5   C6 Wrist Extension  5  5   C7 " Elbow Extension   5  5   C8/T1 Hand Intrinsics   5  5   C8 First Dorsal Interosseus  5  5   C8 Abductor Pollicus Brevis  5  5       Iliopsoas Quadriceps Knee  Flexion Tibialis  anterior Gastro- cnemius EHL   Lower: R     L         Left  Right    Triceps DTR 2+ 2+   Biceps DTR 2+ 2+   Brachioradialis DTR 2+ 2+   Patellar DTR 1+ 1+   Achilles DTR 1+ 1+   Lucio Absent  Absent   Clonus Absent Absent          Sensory: Intact and symmetrical to light touch and pinprick in C2-T1 dermatomes bilaterally. Intact and symmetrical to light touch and pinprick in L1-S1 dermatomes bilaterally.    Cervical spine: ROM is full in flexion, extension and lateral rotation with increased pain on extension greater than flexion, lateral rotation increased pain to the left greater than right  Spurling's maneuver causes neck pain to either side, left greater than right..  Myofascial exam: No Tenderness to palpation across cervical paraspinous region bilaterally.    Lumbar spine: ROM is full with flexion extension and oblique extension with no increased pain.    Talib's test causes no increased pain on either side.    Supine straight leg raise is negative bilaterally.    Internal and external rotation of the hip causes no increased pain on either side.  Myofascial exam: No tenderness to palpation across lumbar paraspinous muscles.    Imagin22 MRI C-spine:  Alignment: Reversal of the normal cervical lordosis centered at C4.  Minimal anterolisthesis of C3 on C4, 2 mm.  Minimal retrolisthesis of C4 on C5 and C5 on C6, 1-2 mm.   Vertebral Column: Vertebral body heights are maintained.  No evidence of an acute fracture or aggressive marrow replacement process. Probable small hemangiomas within the C4 C7 vertebral bodies.  Moderate disc degeneration at C4-5 and C5-6 with moderate intervertebral disc space narrowing, degenerative endplate change and marginal osteophyte formation.   Cord: Mild  flattening of the left ventral aspect of the cord at C4-5.  No focal cord signal abnormality.   Skull base and craniocervical junction: Normal.   Degenerative findings:   C2-C3: The disc is normal in configuration. Mild left facet arthropathy.  There is no neural foraminal stenosis.  There is no spinal canal stenosis.   C3-C4: Mild posterior disc osteophyte complex, which mildly effaces the ventral thecal sac.  Mild left facet arthropathy.  Mild left neural foraminal stenosis.  There is no spinal canal stenosis.   C4-C5: Left asymmetric posterior disc osteophyte complex, which mildly effaces the ventral thecal sac and flattens the left ventral aspect of the cord without significant cord compression or focal cord signal abnormality.  Mild bilateral facet arthropathy.  Moderate left uncovertebral joint spurring.  Moderate left neural foraminal stenosis.  There is no spinal canal stenosis.   C5-C6: Posterior disc osteophyte complex.  Mild bilateral facet arthropathy.  Marked right and moderate left uncovertebral joint spurring.  Severe right and moderate left neural foraminal stenosis.  Ligamentum flavum thickening.  Mild spinal canal stenosis.   C6-C7: Mild posterior disc osteophyte complex.  Mild bilateral facet arthropathy.  Mild left uncovertebral joint spurring.  Mild left neural foraminal stenosis.  There is no spinal canal stenosis.   C7-T1: The disc is normal in configuration.  Mild bilateral facet arthropathy and uncovertebral joint spurring.  Mild bilateral neural foraminal stenosis.  There is no spinal canal stenosis.    10/31/2022 MRI lumbar spine   Alignment: Mild dextroconvex curvature of lower lumbar spine and levoconvex curvature of the thoracolumbar junction.  Mild grade 1 anterolisthesis of L4 on L5 and L5 on S1.     Vertebral column: Vertebral body heights are maintained.  No evidence of an acute fracture or aggressive marrow replacement process. Marked right asymmetric disc space narrowing at T11-12  with mixed degenerative endplate change and marginal osteophyte formation.  Multilevel disc degeneration throughout the lumbar spine with mild-to-moderate disc space narrowing and disc bulges, most pronounced at L1-2, L3-4 and L5-S1.     Cord: Normal.  Conus terminates at L1.     Degenerative findings:     T11-12: Right asymmetric diffuse disc bulge with osteophytic ridging.  Mild bilateral facet arthropathy and ligamentum flavum thickening.  Moderate right neural foraminal stenosis.  Mild spinal canal stenosis.     T12-L1: No significant disc abnormality.  Mild bilateral facet arthropathy ligamentum flavum thickening.  No neural foraminal or spinal canal stenosis.     L1-L2: Diffuse disc bulge with osteophytic ridging.  Mild bilateral facet arthropathy ligamentum flavum thickening.  Mild bilateral neural foraminal stenosis.  There is no spinal canal stenosis.     L2-L3: Mild diffuse disc bulge with osteophytic ridging.  Mild bilateral facet arthropathy ligamentum flavum thickening.  Mild left neural foraminal stenosis.  There is no spinal canal stenosis.     L3-L4: Diffuse disc bulge with osteophytic ridging.  Moderate bilateral facet arthropathy.  Ligamentum flavum thickening.  Mild-to-moderate left and mild right neural foraminal stenosis.  Mild spinal canal stenosis.     L4-L5: Mild diffuse disc bulge.  Marked left greater than right facet arthropathy with left-sided facet effusion.  Ligamentum flavum thickening.  Mild bilateral neural foraminal stenosis.  Mild spinal canal stenosis.     L5-S1: Diffuse disc bulge with osteophytic ridging and superimposed right central/subarticular disc protrusion through an annular fissure.  Marked right greater than left facet arthropathy with facet effusions.  Mild-to-moderate right and mild left neural foraminal stenosis.  Mild spinal canal stenosis.     Paraspinal muscles & soft tissues: No significant abnormalities.      Assessment:   Li Singh is a 52 y.o. year old  female patient who has a past medical history of Anxiety, Gastric ulcer, Hyperlipidemia, and Hypertension. She presents in referral from Shikha Steen for neck pain.     1. Cervical radiculopathy  Procedure Order to Pain Management      2. DDD (degenerative disc disease), cervical        3. DDD (degenerative disc disease), lumbar            Plan:  1. The patient initially did well following the cervical epidural steroid injection and still has some sustained relief.  We discussed that her symptoms are likely due to C5/6 where she has severe foraminal stenosis on the right side.  We are going to try 1 more injection and I will schedule her for a C7-T1 interlaminar epidural steroid injection to the right.  If she does not have lasting relief with this I will update her cervical spine MRI and have her see Neurosurgery.  2. Follow-up in 4 weeks postprocedure or sooner as needed.

## 2023-01-31 NOTE — TELEPHONE ENCOUNTER
Spoke with pt scheduled for 02/17 pt prefer morning. Gave instructions and sent message to her cardio for clearance to hold asa x 7 days. Brielle Lucio NP

## 2023-01-31 NOTE — TELEPHONE ENCOUNTER
Associated Diagnoses       M54.12 Cervical radiculopathy       Procedure -> Epidural Injection (specify level) Cmt: C7/T1 to right, RNIV          Physician -> Srinivasa           Is patient on anti-coagulants? -> Yes Cmt: ASA, clearance needed          Pre   -op Diagnosis -> Cervical radiculopathy           Facility Name: -> [facility]           Follow-up: -> 4 weeks Cmt: follow up with Dr Bernabe

## 2023-02-15 ENCOUNTER — TELEPHONE (OUTPATIENT)
Dept: SURGERY | Facility: HOSPITAL | Age: 53
End: 2023-02-15
Payer: COMMERCIAL

## 2023-03-22 ENCOUNTER — TELEPHONE (OUTPATIENT)
Dept: PAIN MEDICINE | Facility: CLINIC | Age: 53
End: 2023-03-22
Payer: COMMERCIAL

## 2023-03-22 NOTE — TELEPHONE ENCOUNTER
----- Message from Madelaine Huddleston sent at 3/22/2023 10:41 AM CDT -----  Regarding: josephine fraseroindenia request  Name of Who is Calling:JAMIE SMART [[id number]]          What is the request in detail: pt would like a appointment before may please advise           Can the clinic reply by [facility]: no           [facility] [phone] (home)

## 2023-03-22 NOTE — TELEPHONE ENCOUNTER
Patient was calling to schedule cervical epidural. Patient is scheduled for 4/6/2023. Inform patient of all instruction also on to hold aspirin for 7 days.

## 2023-04-03 RX ORDER — LEVOTHYROXINE SODIUM 25 UG/1
25 TABLET ORAL DAILY
COMMUNITY
Start: 2023-03-19

## 2023-04-06 ENCOUNTER — TELEPHONE (OUTPATIENT)
Dept: SURGERY | Facility: HOSPITAL | Age: 53
End: 2023-04-06
Payer: COMMERCIAL

## 2023-04-06 ENCOUNTER — HOSPITAL ENCOUNTER (OUTPATIENT)
Facility: HOSPITAL | Age: 53
Discharge: HOME | End: 2023-04-06
Attending: ANESTHESIOLOGY | Admitting: ANESTHESIOLOGY
Payer: COMMERCIAL

## 2023-04-06 ENCOUNTER — HOSPITAL ENCOUNTER (OUTPATIENT)
Dept: RADIOLOGY | Facility: HOSPITAL | Age: 53
Discharge: HOME | End: 2023-04-06
Attending: ANESTHESIOLOGY | Admitting: ANESTHESIOLOGY
Payer: COMMERCIAL

## 2023-04-06 DIAGNOSIS — M54.12 CERVICAL RADICULOPATHY: ICD-10-CM

## 2023-04-06 DIAGNOSIS — M54.2 NECK PAIN: ICD-10-CM

## 2023-04-06 PROCEDURE — 25500020 PHARM REV CODE 255: Mod: PO | Performed by: ANESTHESIOLOGY

## 2023-04-06 PROCEDURE — 62321 NJX INTERLAMINAR CRV/THRC: CPT | Mod: PO | Performed by: ANESTHESIOLOGY

## 2023-04-06 PROCEDURE — 62321 NJX INTERLAMINAR CRV/THRC: CPT | Mod: ,,, | Performed by: ANESTHESIOLOGY

## 2023-04-06 PROCEDURE — 76000 FLUOROSCOPY <1 HR PHYS/QHP: CPT | Mod: TC,PO

## 2023-04-06 PROCEDURE — 63600175 PHARM REV CODE 636 W HCPCS: Mod: PO | Performed by: ANESTHESIOLOGY

## 2023-04-06 PROCEDURE — 25000003 PHARM REV CODE 250: Mod: PO | Performed by: ANESTHESIOLOGY

## 2023-04-06 PROCEDURE — 62321 PR INJ CERV/THORAC, W/GUIDANCE: ICD-10-PCS | Mod: ,,, | Performed by: ANESTHESIOLOGY

## 2023-04-06 RX ORDER — SODIUM CHLORIDE, SODIUM LACTATE, POTASSIUM CHLORIDE, CALCIUM CHLORIDE 600; 310; 30; 20 MG/100ML; MG/100ML; MG/100ML; MG/100ML
INJECTION, SOLUTION INTRAVENOUS CONTINUOUS
Status: DISCONTINUED | OUTPATIENT
Start: 2023-04-06 | End: 2023-04-06 | Stop reason: HOSPADM

## 2023-04-06 RX ORDER — LIDOCAINE HYDROCHLORIDE 10 MG/ML
INJECTION, SOLUTION EPIDURAL; INFILTRATION; INTRACAUDAL; PERINEURAL
Status: DISCONTINUED | OUTPATIENT
Start: 2023-04-06 | End: 2023-04-06 | Stop reason: HOSPADM

## 2023-04-06 RX ORDER — METHYLPREDNISOLONE ACETATE 80 MG/ML
INJECTION, SUSPENSION INTRA-ARTICULAR; INTRALESIONAL; INTRAMUSCULAR; SOFT TISSUE
Status: DISCONTINUED | OUTPATIENT
Start: 2023-04-06 | End: 2023-04-06 | Stop reason: HOSPADM

## 2023-04-06 RX ADMIN — SODIUM CHLORIDE, POTASSIUM CHLORIDE, SODIUM LACTATE AND CALCIUM CHLORIDE: 600; 310; 30; 20 INJECTION, SOLUTION INTRAVENOUS at 12:04

## 2023-04-06 NOTE — H&P
CC: Neck pain    HPI: The patient is a 53yo woman with a history of cervical radiculopathy here for cervical ZULEYMA. There are no major changes in history and physical from 1/31/23.    Past Medical History:   Diagnosis Date    Anxiety     Gastric ulcer     Hyperlipidemia     Hypertension     Sleep apnea     Thyroid disease        Past Surgical History:   Procedure Laterality Date    COLONOSCOPY      EPIDURAL STEROID INJECTION INTO CERVICAL SPINE N/A 09/22/2022    Procedure: Injection-steroid-epidural-cervical;  Surgeon: Ronald Bernabe MD;  Location: [facility] OR;  Service: Pain Management;  Laterality: N/A;    EPIDURAL STEROID INJECTION INTO CERVICAL SPINE N/A 12/30/2022    Procedure: Injection-steroid-epidural-cervical to right;  Surgeon: Ronald Bernabe MD;  Location: [facility] OR;  Service: Pain Management;  Laterality: N/A;    EPIDURAL STEROID INJECTION INTO LUMBAR SPINE N/A 12/01/2022    Procedure: Injection-steroid-epidural-lumbar L5/S1;  Surgeon: Ronald Bernabe MD;  Location: [facility] OR;  Service: Pain Management;  Laterality: N/A;    INJECTION OF ANESTHETIC AGENT AROUND MEDIAL BRANCH NERVES INNERVATING CERVICAL FACET JOINT Bilateral 10/28/2022    Procedure: Block-nerve-medial branch-cervical C4/5 and C5/6;  Surgeon: Ronald Bernabe MD;  Location: [facility] OR;  Service: Pain Management;  Laterality: Bilateral;       Family History   Problem Relation Age of Onset    Breast cancer Mother 62       Social History     Socioeconomic History    Marital status:    Tobacco Use    Smoking status: Never    Smokeless tobacco: Never   Substance and Sexual Activity    Alcohol use: Not Currently    Drug use: Not Currently       Current Facility-Administered Medications   Medication Dose Route Frequency Provider Last Rate Last Admin    lactated ringers infusion   Intravenous Continuous Ronald Bernabe MD 25 mL/hr at 04/06/23 1205 New Bag at 04/06/23 1205       Review of patient's allergies indicates:  No Known  "Allergies    Vitals:    02/14/23 1606 04/06/23 1157   BP:  107/61   Pulse:  66   Resp:  17   Temp:  97.1 °F (36.2 °C)   TempSrc:  Skin   SpO2:  99%   Weight: 85.3 kg (188 lb) 86.2 kg (190 lb)   Height: 5' 6" (1.676 m) 5' 6" (1.676 m)       ASA 2, Mallampati 2    REVIEW OF SYSTEMS:     GENERAL: No weight loss, malaise or fevers.  HEENT:  No recent changes in vision or hearing  NECK: Negative for lumps, no difficulty with swallowing.  RESPIRATORY: Negative for cough, wheezing or shortness of breath, patient denies any recent URI.  CARDIOVASCULAR: Negative for chest pain, leg swelling or palpitations.  GI: Negative for abdominal discomfort, blood in stools or black stools or change in bowel habits.  MUSCULOSKELETAL: See HPI.  SKIN: Negative for lesions, rash, and itching.  PSYCH: No suicidal or homicidal ideations, no current mood disturbances.  HEMATOLOGY/LYMPHOLOGY: Negative for prolonged bleeding, bruising easily or swollen nodes. Patient is not currently taking any anti-coagulants  ENDO: No history of diabetes or thyroid dysfunction  NEURO: No history of syncope, paralysis, seizures or tremors.All other reviewed and negative other than HPI.    Physical exam:  Gen: A and O x3, pleasant, well-groomed  Skin: No rashes or obvious lesions  HEENT: PERRLA, no obvious deformities on ears or in canals. No thyroid masses, trachea midline, no palpable lymph nodes in neck, axilla.  CVS: Regular rate and rhythm, normal S1 and S2, no murmurs.  Resp: Clear to auscultation bilaterally.  Abdomen: Soft, NT/ND, normal bowel sounds present.  Musculoskeletal/Neuro: Moving all extremities    Assessment:  Cervical radiculopathy  -     Case Request Operating Room: Injection-steroid-epidural-cervical C7/t1  (to the right)  -     Place in Outpatient; Standing  -     Vital signs; Standing  -     Place 18-22 gauage peripheral IV ; Standing  -     Verify informed consent; Standing  -     Notify physician ; Standing  -     Notify physician ; " Standing  -     Notify physician (specify); Standing  -     Diet NPO; Standing  -     POCT urine pregnancy; Standing  -     lactated ringers infusion  -     Cancel: Place DARRION hose; Standing    Other orders  -     IP VTE LOW RISK PATIENT; Standing

## 2023-04-06 NOTE — OP NOTE
PROCEDURE DATE: 4/6/2023    Procedure: C7-T1 cervical interlaminar epidural steroid injection under utilizing fluoroscopy.    Diagnosis: Cervical Radiculopathy    POSTOP DIAGNOSIS: SAME    Physician: Ronald Bernabe MD    Medications injected:  Methylprednisone 80mg followed by a slow injection of 4 mL sterile, preservative-free normal saline.    Local anesthetic used: Lidocaine 1%, 4 ml.    Sedation Medications: 2mg versedd    Complications:  nnone    Estimated blood loss: none    Technique:  A time-out was taken to identify patient and procedure prior to starting the procedure.  With the patient laying in a prone position with the neck in a mid-flexed forward position, the area was prepped and draped in the usual sterile fashion using ChloraPrep and a fenestrated drape.  The area was determined under AP fluoroscopic guidance.  Local anesthetic was given using a 25-gauge 1.5 inch needle by raising a wheal and then infiltrating ventrally.  A 3.5 inch 20-gauge Touhy needle was introduced under fluoroscopic guidance to meet the lamina of C7.  The needle was then hinged under the lamina then advanced using loss of resistance technique.  Once the tip of the needle was in the desired position, the contrast dye Omnipaque was injected to determine placement and no uptake.  The steroid was then injected slowly followed by a slow injection of 4 mL of the sterile preservative-free normal saline.  The patient tolerated the procedure well.    The patient was monitored after the procedure and was given post-procedure and discharge instructions to follow at home. The patient was discharged in a stable condition.

## 2023-04-06 NOTE — TELEPHONE ENCOUNTER
Pt does not have a followup appt scheduled with Dr. Bernabe after today's ZULEYMA. Please call pt with appt.

## 2023-04-10 VITALS
BODY MASS INDEX: 30.53 KG/M2 | DIASTOLIC BLOOD PRESSURE: 68 MMHG | SYSTOLIC BLOOD PRESSURE: 121 MMHG | HEIGHT: 66 IN | HEART RATE: 65 BPM | RESPIRATION RATE: 16 BRPM | TEMPERATURE: 97 F | OXYGEN SATURATION: 100 % | WEIGHT: 190 LBS

## 2023-05-17 ENCOUNTER — TELEPHONE (OUTPATIENT)
Dept: NEUROSURGERY | Facility: CLINIC | Age: 53
End: 2023-05-17
Payer: COMMERCIAL

## 2023-05-17 ENCOUNTER — OFFICE VISIT (OUTPATIENT)
Dept: PAIN MEDICINE | Facility: CLINIC | Age: 53
End: 2023-05-17
Payer: COMMERCIAL

## 2023-05-17 VITALS
HEART RATE: 73 BPM | DIASTOLIC BLOOD PRESSURE: 66 MMHG | WEIGHT: 185.88 LBS | HEIGHT: 66 IN | BODY MASS INDEX: 29.87 KG/M2 | SYSTOLIC BLOOD PRESSURE: 116 MMHG

## 2023-05-17 DIAGNOSIS — M79.645 PAIN IN FINGER OF LEFT HAND: ICD-10-CM

## 2023-05-17 DIAGNOSIS — M50.30 DDD (DEGENERATIVE DISC DISEASE), CERVICAL: ICD-10-CM

## 2023-05-17 DIAGNOSIS — M51.36 DDD (DEGENERATIVE DISC DISEASE), LUMBAR: ICD-10-CM

## 2023-05-17 DIAGNOSIS — M54.12 CERVICAL RADICULOPATHY: Primary | ICD-10-CM

## 2023-05-17 PROCEDURE — 3008F PR BODY MASS INDEX (BMI) DOCUMENTED: ICD-10-PCS | Mod: CPTII,S$GLB,, | Performed by: PHYSICIAN ASSISTANT

## 2023-05-17 PROCEDURE — 1160F RVW MEDS BY RX/DR IN RCRD: CPT | Mod: CPTII,S$GLB,, | Performed by: PHYSICIAN ASSISTANT

## 2023-05-17 PROCEDURE — 3008F BODY MASS INDEX DOCD: CPT | Mod: CPTII,S$GLB,, | Performed by: PHYSICIAN ASSISTANT

## 2023-05-17 PROCEDURE — 99213 OFFICE O/P EST LOW 20 MIN: CPT | Mod: S$GLB,,, | Performed by: PHYSICIAN ASSISTANT

## 2023-05-17 PROCEDURE — 1160F PR REVIEW ALL MEDS BY PRESCRIBER/CLIN PHARMACIST DOCUMENTED: ICD-10-PCS | Mod: CPTII,S$GLB,, | Performed by: PHYSICIAN ASSISTANT

## 2023-05-17 PROCEDURE — 1159F PR MEDICATION LIST DOCUMENTED IN MEDICAL RECORD: ICD-10-PCS | Mod: CPTII,S$GLB,, | Performed by: PHYSICIAN ASSISTANT

## 2023-05-17 PROCEDURE — 99999 PR PBB SHADOW E&M-EST. PATIENT-LVL V: ICD-10-PCS | Mod: PBBFAC,,, | Performed by: PHYSICIAN ASSISTANT

## 2023-05-17 PROCEDURE — 3074F SYST BP LT 130 MM HG: CPT | Mod: CPTII,S$GLB,, | Performed by: PHYSICIAN ASSISTANT

## 2023-05-17 PROCEDURE — 99999 PR PBB SHADOW E&M-EST. PATIENT-LVL V: CPT | Mod: PBBFAC,,, | Performed by: PHYSICIAN ASSISTANT

## 2023-05-17 PROCEDURE — 3078F PR MOST RECENT DIASTOLIC BLOOD PRESSURE < 80 MM HG: ICD-10-PCS | Mod: CPTII,S$GLB,, | Performed by: PHYSICIAN ASSISTANT

## 2023-05-17 PROCEDURE — 3074F PR MOST RECENT SYSTOLIC BLOOD PRESSURE < 130 MM HG: ICD-10-PCS | Mod: CPTII,S$GLB,, | Performed by: PHYSICIAN ASSISTANT

## 2023-05-17 PROCEDURE — 99213 PR OFFICE/OUTPT VISIT, EST, LEVL III, 20-29 MIN: ICD-10-PCS | Mod: S$GLB,,, | Performed by: PHYSICIAN ASSISTANT

## 2023-05-17 PROCEDURE — 1159F MED LIST DOCD IN RCRD: CPT | Mod: CPTII,S$GLB,, | Performed by: PHYSICIAN ASSISTANT

## 2023-05-17 PROCEDURE — 3078F DIAST BP <80 MM HG: CPT | Mod: CPTII,S$GLB,, | Performed by: PHYSICIAN ASSISTANT

## 2023-05-17 RX ORDER — CEFUROXIME AXETIL 250 MG/1
TABLET ORAL
COMMUNITY

## 2023-05-17 RX ORDER — METHYLPREDNISOLONE 4 MG/1
TABLET ORAL
Qty: 21 EACH | Refills: 0 | Status: SHIPPED | OUTPATIENT
Start: 2023-05-17 | End: 2023-06-07

## 2023-05-23 NOTE — PROGRESS NOTES
This note was completed with dictation software and grammatical errors may exist.    Chief Complaint   Patient presents with    Follow-up        HPI: Li Singh is a 52 y.o. year old female patient who has a past medical history of Anxiety, Gastric ulcer, Hyperlipidemia, Hypertension, Sleep apnea, and Thyroid disease. She presents in referral from No ref. provider found for neck pain.  She is status post C7-T1 interlaminar epidural steroid injection on 04/06/2023 with 4 weeks of moderate relief.  However, her pain has returned and is located in her right greater than left neck radiating into her right trapezius muscle, down her right arm and into her right thumb.  She also continues to have some low back and left buttock pain but continues to have some relief from the epidural steroid injection last year.  Her other complaint is left 2nd digit pain and swelling following an injury.  She denies weakness or numbness.    Previous history:  The patient reports that she has had neck pain for about the last 10 years, denies any specific trauma that may have caused this.  Is located in her bilateral neck at the base of her neck out into the bilateral  numbness and tingling in the right arm especially when tilting her head to the left and gets numbness ,  and tingling in the 1st through 3rd fingers.  The pain also radiates into the trapezius sometimes into the clavicle and down the middle of her spine through the shoulder blades.   She reports that at time she gets headaches in the occipital region that advanced into the front of her head at times as well.   She denies any balance issues, denies any regan weakness.    Pain intervention history:  She apparently has undergone injections in her neck with no relief. She is status post C7-T1 interlaminar epidural steroid injection on 09/22/2022 with 30% relief, reports that falling and using the tractor after the injection may have affected the results. She is status post  bilateral C4/5 and C5/6 diagnostic medial branch nerve blocks on 10/28/2022 with 0% relief. She is status post L5/S1 interlaminar epidural steroid injection on 12/01/2022 with greater than 60% relief.  She is status post C7-T1 interlaminar epidural steroid injection on 12/30/2022 with excellent relief lasting 3-4 weeks, now reporting mild relief.  She is status post C7-T1 interlaminar epidural steroid injection on 04/06/2023 with 4 weeks of moderate relief.     Spine surgeries:  The patient states that she had seen Dr. Al in the past who had recommended surgery    Antineuropathics:  Gabapentin 600 3 times daily  NSAIDs:  Physical therapy:  She is done physical therapy in the past, chiropractic care with some benefit.  She states that she was seeing a chiropractor at least once a week but was not able to do this for 3 months and states that her pain greatly worsened during that time.  Antidepressants:  BuSpar 5 mg, Wellbutrin 150 mg  Muscle relaxers:  Xanax 1 mg, Soma  Opioids:  Antiplatelets/Anticoagulants:  Aspirin 81    ROS:  She reports headaches, stomach ulcer, easy bruising, joint stiffness, back pain, memory loss, dizziness, difficulty sleeping.  Balance of review of systems negative.    No results found for: LABA1C, HGBA1C    No results found for: WBC, HGB, HCT, MCV, PLT    Past Medical History:   Diagnosis Date    Anxiety     Gastric ulcer     Hyperlipidemia     Hypertension     Sleep apnea     Thyroid disease        Past Surgical History:   Procedure Laterality Date    COLONOSCOPY      EPIDURAL STEROID INJECTION INTO CERVICAL SPINE N/A 09/22/2022    Procedure: Injection-steroid-epidural-cervical;  Surgeon: Ronald Bernabe MD;  Location: [facility] OR;  Service: Pain Management;  Laterality: N/A;    EPIDURAL STEROID INJECTION INTO CERVICAL SPINE N/A 12/30/2022    Procedure: Injection-steroid-epidural-cervical to right;  Surgeon: Ronald Bernabe MD;  Location: [facility] OR;  Service: Pain Management;   "Laterality: N/A;    EPIDURAL STEROID INJECTION INTO CERVICAL SPINE N/A 4/6/2023    Procedure: Injection-steroid-epidural-cervical C7/t1  (to the right);  Surgeon: Ronald Bernabe MD;  Location: [facility] OR;  Service: Pain Management;  Laterality: N/A;  (pt would like am please)    EPIDURAL STEROID INJECTION INTO LUMBAR SPINE N/A 12/01/2022    Procedure: Injection-steroid-epidural-lumbar L5/S1;  Surgeon: Ronald Bernabe MD;  Location: [facility] OR;  Service: Pain Management;  Laterality: N/A;    INJECTION OF ANESTHETIC AGENT AROUND MEDIAL BRANCH NERVES INNERVATING CERVICAL FACET JOINT Bilateral 10/28/2022    Procedure: Block-nerve-medial branch-cervical C4/5 and C5/6;  Surgeon: Ronald Bernabe MD;  Location: [facility] OR;  Service: Pain Management;  Laterality: Bilateral;       Social History     Socioeconomic History    Marital status:    Tobacco Use    Smoking status: Never    Smokeless tobacco: Never   Substance and Sexual Activity    Alcohol use: Not Currently    Drug use: Not Currently         Medications/Allergies: See med card    Vitals:    05/17/23 1306   BP: 116/66   Pulse: 73   Weight: 84.3 kg (185 lb 13.6 oz)   Height: 5' 6" (1.676 m)   PainSc:   7   PainLoc: Neck     Body mass index is 30 kg/m².    Physical exam:  Gen: A and O x3, pleasant, well-groomed  Skin: No rashes or obvious lesions  HEENT: PERRLA, no obvious deformities on ears or in canals.Trachea midline.  CVS: Regular rate and rhythm, normal palpable pulses.  Resp: Clear to auscultation bilaterally, no wheezes or rales.  Abdomen: Soft, NT/ND.  Musculoskeletal: Able to heel walk, toe walk. No antalgic gait.     Neuro:  Motor:    Right Left   C4 Shoulder Abduction  5  5   C5 Elbow Flexion    5  5   C6 Wrist Extension  5  5   C7 Elbow Extension   5  5   C8/T1 Hand Intrinsics   5  5   C8 First Dorsal Interosseus  5  5   C8 Abductor Pollicus Brevis  5  5       Iliopsoas Quadriceps Knee  Flexion Tibialis  anterior Gastro- cnemius EHL   Lower: R " 5/5 5/5 5/5 5/5 5/5 5/5    L 5/5 5/5 5/5 5/5 5/5 5/5        Left  Right    Triceps DTR 2+ 2+   Biceps DTR 2+ 2+   Brachioradialis DTR 2+ 2+   Patellar DTR 1+ 1+   Achilles DTR 1+ 1+   Lucio Absent  Absent   Clonus Absent Absent          Sensory: Intact and symmetrical to light touch and pinprick in C2-T1 dermatomes bilaterally. Intact and symmetrical to light touch and pinprick in L1-S1 dermatomes bilaterally.    Cervical spine: ROM is full in flexion, extension and lateral rotation with increased pain on extension greater than flexion, lateral rotation increased pain to the left greater than right  Spurling's maneuver causes neck pain to either side, left greater than right..  Myofascial exam: No Tenderness to palpation across cervical paraspinous region bilaterally.    Lumbar spine: ROM is full with flexion extension and oblique extension with no increased pain.    Talib's test causes no increased pain on either side.    Supine straight leg raise is negative bilaterally.    Internal and external rotation of the hip causes no increased pain on either side.  Myofascial exam: No tenderness to palpation across lumbar paraspinous muscles.    Imagin22 MRI C-spine:  Alignment: Reversal of the normal cervical lordosis centered at C4.  Minimal anterolisthesis of C3 on C4, 2 mm.  Minimal retrolisthesis of C4 on C5 and C5 on C6, 1-2 mm.   Vertebral Column: Vertebral body heights are maintained.  No evidence of an acute fracture or aggressive marrow replacement process. Probable small hemangiomas within the C4 C7 vertebral bodies.  Moderate disc degeneration at C4-5 and C5-6 with moderate intervertebral disc space narrowing, degenerative endplate change and marginal osteophyte formation.   Cord: Mild flattening of the left ventral aspect of the cord at C4-5.  No focal cord signal abnormality.   Skull base and craniocervical junction: Normal.   Degenerative findings:   C2-C3: The disc is normal in configuration.  Mild left facet arthropathy.  There is no neural foraminal stenosis.  There is no spinal canal stenosis.   C3-C4: Mild posterior disc osteophyte complex, which mildly effaces the ventral thecal sac.  Mild left facet arthropathy.  Mild left neural foraminal stenosis.  There is no spinal canal stenosis.   C4-C5: Left asymmetric posterior disc osteophyte complex, which mildly effaces the ventral thecal sac and flattens the left ventral aspect of the cord without significant cord compression or focal cord signal abnormality.  Mild bilateral facet arthropathy.  Moderate left uncovertebral joint spurring.  Moderate left neural foraminal stenosis.  There is no spinal canal stenosis.   C5-C6: Posterior disc osteophyte complex.  Mild bilateral facet arthropathy.  Marked right and moderate left uncovertebral joint spurring.  Severe right and moderate left neural foraminal stenosis.  Ligamentum flavum thickening.  Mild spinal canal stenosis.   C6-C7: Mild posterior disc osteophyte complex.  Mild bilateral facet arthropathy.  Mild left uncovertebral joint spurring.  Mild left neural foraminal stenosis.  There is no spinal canal stenosis.   C7-T1: The disc is normal in configuration.  Mild bilateral facet arthropathy and uncovertebral joint spurring.  Mild bilateral neural foraminal stenosis.  There is no spinal canal stenosis.    10/31/2022 MRI lumbar spine   Alignment: Mild dextroconvex curvature of lower lumbar spine and levoconvex curvature of the thoracolumbar junction.  Mild grade 1 anterolisthesis of L4 on L5 and L5 on S1.     Vertebral column: Vertebral body heights are maintained.  No evidence of an acute fracture or aggressive marrow replacement process. Marked right asymmetric disc space narrowing at T11-12 with mixed degenerative endplate change and marginal osteophyte formation.  Multilevel disc degeneration throughout the lumbar spine with mild-to-moderate disc space narrowing and disc bulges, most pronounced at  L1-2, L3-4 and L5-S1.     Cord: Normal.  Conus terminates at L1.     Degenerative findings:     T11-12: Right asymmetric diffuse disc bulge with osteophytic ridging.  Mild bilateral facet arthropathy and ligamentum flavum thickening.  Moderate right neural foraminal stenosis.  Mild spinal canal stenosis.     T12-L1: No significant disc abnormality.  Mild bilateral facet arthropathy ligamentum flavum thickening.  No neural foraminal or spinal canal stenosis.     L1-L2: Diffuse disc bulge with osteophytic ridging.  Mild bilateral facet arthropathy ligamentum flavum thickening.  Mild bilateral neural foraminal stenosis.  There is no spinal canal stenosis.     L2-L3: Mild diffuse disc bulge with osteophytic ridging.  Mild bilateral facet arthropathy ligamentum flavum thickening.  Mild left neural foraminal stenosis.  There is no spinal canal stenosis.     L3-L4: Diffuse disc bulge with osteophytic ridging.  Moderate bilateral facet arthropathy.  Ligamentum flavum thickening.  Mild-to-moderate left and mild right neural foraminal stenosis.  Mild spinal canal stenosis.     L4-L5: Mild diffuse disc bulge.  Marked left greater than right facet arthropathy with left-sided facet effusion.  Ligamentum flavum thickening.  Mild bilateral neural foraminal stenosis.  Mild spinal canal stenosis.     L5-S1: Diffuse disc bulge with osteophytic ridging and superimposed right central/subarticular disc protrusion through an annular fissure.  Marked right greater than left facet arthropathy with facet effusions.  Mild-to-moderate right and mild left neural foraminal stenosis.  Mild spinal canal stenosis.     Paraspinal muscles & soft tissues: No significant abnormalities.      Assessment:   Li Singh is a 52 y.o. year old female patient who has a past medical history of Anxiety, Gastric ulcer, Hyperlipidemia, and Hypertension. She presents in referral from Shikha Steen for neck pain.     1. Cervical radiculopathy        2. DDD  (degenerative disc disease), cervical  MRI Cervical Spine Without Contrast    Ambulatory referral/consult to Neurosurgery      3. DDD (degenerative disc disease), lumbar        4. Pain in finger of left hand  Ambulatory referral/consult to Orthopedics          Plan:  1. Since the patient is not having lasting relief of her neck and arm pain following epidural steroid injections, physical therapy and medication I will update her cervical spine MRI and have her see Neurosurgery for further evaluation.    2. I placed a referral to our hand specialist for her left 2nd digit injury.    3. Follow-up in 2 months or sooner as needed.

## 2023-05-30 ENCOUNTER — HOSPITAL ENCOUNTER (OUTPATIENT)
Dept: RADIOLOGY | Facility: HOSPITAL | Age: 53
Discharge: HOME | End: 2023-05-30
Attending: PHYSICIAN ASSISTANT
Payer: COMMERCIAL

## 2023-05-30 ENCOUNTER — OFFICE VISIT (OUTPATIENT)
Dept: ORTHOPEDICS | Facility: CLINIC | Age: 53
End: 2023-05-30
Payer: COMMERCIAL

## 2023-05-30 VITALS — WEIGHT: 185 LBS | BODY MASS INDEX: 29.73 KG/M2 | HEIGHT: 66 IN

## 2023-05-30 DIAGNOSIS — M50.30 DDD (DEGENERATIVE DISC DISEASE), CERVICAL: ICD-10-CM

## 2023-05-30 DIAGNOSIS — S69.92XA INJURY OF LEFT INDEX FINGER, INITIAL ENCOUNTER: Primary | ICD-10-CM

## 2023-05-30 DIAGNOSIS — M79.645 PAIN IN FINGER OF LEFT HAND: ICD-10-CM

## 2023-05-30 PROCEDURE — 1160F RVW MEDS BY RX/DR IN RCRD: CPT | Mod: CPTII,S$GLB,, | Performed by: PHYSICIAN ASSISTANT

## 2023-05-30 PROCEDURE — 99203 PR OFFICE/OUTPT VISIT, NEW, LEVL III, 30-44 MIN: ICD-10-PCS | Mod: S$GLB,,, | Performed by: PHYSICIAN ASSISTANT

## 2023-05-30 PROCEDURE — 3008F PR BODY MASS INDEX (BMI) DOCUMENTED: ICD-10-PCS | Mod: CPTII,S$GLB,, | Performed by: PHYSICIAN ASSISTANT

## 2023-05-30 PROCEDURE — 1159F PR MEDICATION LIST DOCUMENTED IN MEDICAL RECORD: ICD-10-PCS | Mod: CPTII,S$GLB,, | Performed by: PHYSICIAN ASSISTANT

## 2023-05-30 PROCEDURE — 99203 OFFICE O/P NEW LOW 30 MIN: CPT | Mod: S$GLB,,, | Performed by: PHYSICIAN ASSISTANT

## 2023-05-30 PROCEDURE — 72141 MRI NECK SPINE W/O DYE: CPT | Mod: 26,,, | Performed by: RADIOLOGY

## 2023-05-30 PROCEDURE — 1159F MED LIST DOCD IN RCRD: CPT | Mod: CPTII,S$GLB,, | Performed by: PHYSICIAN ASSISTANT

## 2023-05-30 PROCEDURE — 1160F PR REVIEW ALL MEDS BY PRESCRIBER/CLIN PHARMACIST DOCUMENTED: ICD-10-PCS | Mod: CPTII,S$GLB,, | Performed by: PHYSICIAN ASSISTANT

## 2023-05-30 PROCEDURE — 73140 X-RAY EXAM OF FINGER(S): CPT | Mod: TC,PO,LT

## 2023-05-30 PROCEDURE — 3008F BODY MASS INDEX DOCD: CPT | Mod: CPTII,S$GLB,, | Performed by: PHYSICIAN ASSISTANT

## 2023-05-30 PROCEDURE — 72141 MRI NECK SPINE W/O DYE: CPT | Mod: TC,PO

## 2023-05-30 PROCEDURE — 72141 MRI CERVICAL SPINE WITHOUT CONTRAST: ICD-10-PCS | Mod: 26,,, | Performed by: RADIOLOGY

## 2023-05-30 PROCEDURE — 73140 XR FINGER 2 OR MORE VIEWS LEFT: ICD-10-PCS | Mod: 26,LT,, | Performed by: RADIOLOGY

## 2023-05-30 PROCEDURE — 73140 X-RAY EXAM OF FINGER(S): CPT | Mod: 26,LT,, | Performed by: RADIOLOGY

## 2023-05-30 PROCEDURE — 99999 PR PBB SHADOW E&M-EST. PATIENT-LVL V: ICD-10-PCS | Mod: PBBFAC,,, | Performed by: PHYSICIAN ASSISTANT

## 2023-05-30 PROCEDURE — 99999 PR PBB SHADOW E&M-EST. PATIENT-LVL V: CPT | Mod: PBBFAC,,, | Performed by: PHYSICIAN ASSISTANT

## 2023-05-30 RX ORDER — MELOXICAM 15 MG/1
15 TABLET ORAL DAILY
Qty: 28 TABLET | Refills: 0 | Status: SHIPPED | OUTPATIENT
Start: 2023-05-30

## 2023-05-30 NOTE — PROGRESS NOTES
5/30/2023    Chief Complaint:  Chief Complaint   Patient presents with    Left Hand - Injury, Pain     Left index finger        HPI:  Li Singh is a 52 y.o. female, who presents to clinic today for evaluation of her left index finger injury.  States approximally 2-3 months ago she jammed her finger while playing with her grandchild.  States since that time she has had a bump/deformity over the dorsum of the DIP joint of the left index finger.  States she also has pain of the left index finger with activity.  States pain is better with rest.  States pain on average is 6/10.  Denies any previous medical treatment.  Denies any other complaints at this time.    PMHX:  Past Medical History:   Diagnosis Date    Anxiety     Gastric ulcer     Hyperlipidemia     Hypertension     Sleep apnea     Thyroid disease        PSHX:  Past Surgical History:   Procedure Laterality Date    COLONOSCOPY      EPIDURAL STEROID INJECTION INTO CERVICAL SPINE N/A 09/22/2022    Procedure: Injection-steroid-epidural-cervical;  Surgeon: Ronald Bernabe MD;  Location: [facility] OR;  Service: Pain Management;  Laterality: N/A;    EPIDURAL STEROID INJECTION INTO CERVICAL SPINE N/A 12/30/2022    Procedure: Injection-steroid-epidural-cervical to right;  Surgeon: Ronald Bernabe MD;  Location: [facility] OR;  Service: Pain Management;  Laterality: N/A;    EPIDURAL STEROID INJECTION INTO CERVICAL SPINE N/A 4/6/2023    Procedure: Injection-steroid-epidural-cervical C7/t1  (to the right);  Surgeon: Ronald Bernabe MD;  Location: [facility] OR;  Service: Pain Management;  Laterality: N/A;  (pt would like am please)    EPIDURAL STEROID INJECTION INTO LUMBAR SPINE N/A 12/01/2022    Procedure: Injection-steroid-epidural-lumbar L5/S1;  Surgeon: Ronald Bernabe MD;  Location: [facility] OR;  Service: Pain Management;  Laterality: N/A;    INJECTION OF ANESTHETIC AGENT AROUND MEDIAL BRANCH NERVES INNERVATING CERVICAL FACET JOINT Bilateral 10/28/2022    Procedure:  Block-nerve-medial branch-cervical C4/5 and C5/6;  Surgeon: Ronald Bernabe MD;  Location: [facility] OR;  Service: Pain Management;  Laterality: Bilateral;       FMHX:  Family History   Problem Relation Age of Onset    Breast cancer Mother 62       SOCHX:  Social History     Tobacco Use    Smoking status: Never    Smokeless tobacco: Never   Substance Use Topics    Alcohol use: Not Currently       ALLERGIES:  Patient has no known allergies.    CURRENT MEDICATIONS:  Current Outpatient Medications on File Prior to Visit   Medication Sig Dispense Refill    albuterol (PROVENTIL) 2.5 mg /3 mL (0.083 %) nebulizer solution albuterol sulfate 2.5 mg/3 mL (0.083 %) solution for nebulization   INHALE 1 VIAL USING NEBULIZER EVERY 6 HOURS AS NEEDED FOR WHEEZING *THANK YOU* *TAVO MACK*      ALPRAZolam (XANAX) 1 MG tablet alprazolam 1 mg tablet   TAKE ONE TABLET BY MOUTH ONCE DAILY AS NEEDED      aspirin (ECOTRIN) 81 MG EC tablet Take 81 mg by mouth once daily.      atorvastatin (LIPITOR) 10 MG tablet atorvastatin 10 mg tablet   TAKE ONE TABLET BY MOUTH ONCE DAILY.      buPROPion (WELLBUTRIN SR) 150 MG TBSR 12 hr tablet bupropion HCl  mg tablet,12 hr sustained-release   TAKE 1 TABLET BY MOUTH THREE TIMES DAILY *THANK YOU* *TAVO MACK*      busPIRone (BUSPAR) 5 MG Tab buspirone 5 mg tablet   TAKE 1 TABLET BY MOUTH THREE TIMES DAILY AS NEEDED FOR ANXIETY      cefUROXime (CEFTIN) 250 MG tablet cefuroxime axetil 250 mg tablet   TAKE 2 TABLETS BY MOUTH TWICE DAILY FOR 2 DAYS, THEN TAKE 1 TABLET BY MOUTH TWICE DAILY FOR 5 DAYS      diclofenac sodium (VOLTAREN) 1 % Gel diclofenac 1 % topical gel   APPLY TO AFFECTED AREA TOPICALLY FOUR TIMES DAILY AS NEEDED THANK YOU TAVO MACK      ergocalciferol (ERGOCALCIFEROL) 50,000 unit Cap Vitamin D2 1,250 mcg (50,000 unit) capsule   TAKE 1 CAPSULE BY MOUTH EVERY 7 DAYS *THANK YOU* *TAVO MACK*      famotidine (PEPCID) 40 MG tablet famotidine 40 mg tablet      fluticasone furoate-vilanteroL (BREO)  "200-25 mcg/dose DsDv diskus inhaler Breo Ellipta 200 mcg-25 mcg/dose powder for inhalation   inhale ONE PUFF BY MOUTH daily FOR breathing/asthma THANK YOU TAVO MACK      gabapentin (NEURONTIN) 600 MG tablet gabapentin 600 mg tablet   TAKE 1 TABLET BY MOUTH 3 TIMES DAILY      hydroCHLOROthiazide (MICROZIDE) 12.5 mg capsule hydrochlorothiazide 12.5 mg capsule   TAKE 1 CAPSULE BY MOUTH DAILY THANK YOU TAVO MACK      ketorolac (TORADOL) 10 mg tablet TAKE 1 TABLET (10 MG TOTAL) BY MOUTH DAILY AS NEEDED FOR PAIN. 5 tablet 0    levothyroxine (SYNTHROID) 25 MCG tablet Take 25 mcg by mouth Daily.      methylPREDNISolone (MEDROL DOSEPACK) 4 mg tablet use as directed 21 each 0    montelukast (SINGULAIR) 10 mg tablet montelukast 10 mg tablet   TAKE 1 TABLET BY MOUTH EVERY EVENING *THANK YOU* *TAVO MACK*      mupirocin (BACTROBAN) 2 % ointment Apply topically 2 (two) times daily.      nebivoloL (BYSTOLIC) 5 MG Tab nebivolol 5 mg tablet   TAKE 1 TABLET BY MOUTH DAILY FOR BLOOD PRESSURE *THANK YOU* *TAVO MACK*      ondansetron (ZOFRAN) 8 MG tablet ondansetron HCl 8 mg tablet   TAKE 1 TABLET BY MOUTH EVERY 8 HOURS AS NEEDED      pantoprazole (PROTONIX) 40 MG tablet 40 mg daily as needed.      spironolactone (ALDACTONE) 50 MG tablet       terbinafine HCL (LAMISIL) 250 mg tablet terbinafine HCl 250 mg tablet   TAKE 1 TABLET BY MOUTH ONCE A DAY      tirzepatide (MOUNJARO SUBQ) Inject into the skin.       No current facility-administered medications on file prior to visit.       REVIEW OF SYSTEMS:  Review of Systems   Constitutional: Negative.    HENT: Negative.     Eyes: Negative.    Respiratory: Negative.     Cardiovascular: Negative.    Gastrointestinal: Negative.    Genitourinary: Negative.    Musculoskeletal:  Positive for joint pain.   Skin: Negative.    Neurological: Negative.    Endo/Heme/Allergies: Negative.    Psychiatric/Behavioral: Negative.       GENERAL PHYSICAL EXAM:   Ht 5' 6" (1.676 m)   Wt 83.9 kg (185 lb)   BMI " 29.86 kg/m²    GEN: well developed, well nourished, no acute distress   HENT: Normocephalic, atraumatic   EYES: No discharge, conjunctiva normal   NECK: Supple, non-tender   PULM: No wheezing, no respiratory distress   CV: RRR   ABD: Soft, non-tender    ORTHO EXAM:   Examination of the left index finger reveals no edema, erythema, ecchymosis, or skin breakdown.  No mallet finger deformity noted.  Presence of bony prominence over the dorsum of the DIP joint of the left index finger.  Able to fully flex and extend the left index finger.  Tenderness palpation of the D IP joint.  5/5 /intrinsic strength.  Normal sensation of the left index finger.  Capillary refill less than 2 seconds.    RADIOLOGY:   X-rays of the left index finger were taken today in clinic.  X-rays reviewed by myself.  Imaging showed the presence of a mallet finger fracture of the distal phalanx of the left index finger that involves greater than 50% of the articular surface.  Presence of minimal subluxation of the distal phalanx with no evidence of drooping of the distal phalanx.  No osseous destructive/erosive processes noted.  No radiopaque foreign body or mass noted.  No other significant bony abnormalities noted.    ASSESSMENT:   Mallet finger fracture of the left index finger sequela    PLAN:  1. I discussed with Li Singh the mallet finger fracture pathology and treatment options in detail during today's visit.  After treatment options were discussed, we decided considering it has been 3 months since her injury the best course of action this time is to perform some splinting for activity and perform a short course of oral prescription NSAIDs via Mobic. We discussed considering that they have no kidney dysfunction, not currently taking blood thinners, no uncontrolled GERD/peptic ulcer disease, no longstanding history of cardiac disease, and no adverse effects to NSAIDs that they are a candidate for oral prescription NSAID therapy.  She  verbally agreed with the treatment plan.      2. She was prescribed Mobic 15 mg to be taken once daily.  She was instructed to discontinue medication for any adverse effects.  She was instructed not take any other type of NSAIDs while taking this medication. She verbalized understanding.      3. She was provided a removable Velcro D IP joint extension splint in clinic today.    4.  I would like her follow up in clinic in 3 weeks for repeat evaluation.  She was instructed to contact clinic for any problems or concerns in the interim.

## 2023-06-07 ENCOUNTER — HOSPITAL ENCOUNTER (OUTPATIENT)
Dept: RADIOLOGY | Facility: HOSPITAL | Age: 53
Discharge: HOME | End: 2023-06-07
Attending: STUDENT IN AN ORGANIZED HEALTH CARE EDUCATION/TRAINING PROGRAM
Payer: COMMERCIAL

## 2023-06-07 ENCOUNTER — OFFICE VISIT (OUTPATIENT)
Dept: NEUROSURGERY | Facility: CLINIC | Age: 53
End: 2023-06-07
Payer: COMMERCIAL

## 2023-06-07 VITALS
BODY MASS INDEX: 29.72 KG/M2 | HEIGHT: 66 IN | DIASTOLIC BLOOD PRESSURE: 87 MMHG | WEIGHT: 184.94 LBS | RESPIRATION RATE: 18 BRPM | SYSTOLIC BLOOD PRESSURE: 140 MMHG | HEART RATE: 65 BPM

## 2023-06-07 DIAGNOSIS — M48.02 CERVICAL SPINAL STENOSIS: Primary | ICD-10-CM

## 2023-06-07 DIAGNOSIS — G56.21 CUBITAL TUNNEL SYNDROME ON RIGHT: ICD-10-CM

## 2023-06-07 DIAGNOSIS — M50.30 DDD (DEGENERATIVE DISC DISEASE), CERVICAL: Primary | ICD-10-CM

## 2023-06-07 DIAGNOSIS — M80.00XK OSTEOPOROSIS WITH CURRENT PATHOLOGICAL FRACTURE WITH NONUNION, UNSPECIFIED OSTEOPOROSIS TYPE, SUBSEQUENT ENCOUNTER: ICD-10-CM

## 2023-06-07 DIAGNOSIS — M50.30 DDD (DEGENERATIVE DISC DISEASE), CERVICAL: ICD-10-CM

## 2023-06-07 DIAGNOSIS — M50.10 CERVICAL DISC DISORDER WITH RADICULOPATHY: ICD-10-CM

## 2023-06-07 PROCEDURE — 1159F MED LIST DOCD IN RCRD: CPT | Mod: CPTII,S$GLB,, | Performed by: STUDENT IN AN ORGANIZED HEALTH CARE EDUCATION/TRAINING PROGRAM

## 2023-06-07 PROCEDURE — 99215 PR OFFICE/OUTPT VISIT, EST, LEVL V, 40-54 MIN: ICD-10-PCS | Mod: S$GLB,,, | Performed by: STUDENT IN AN ORGANIZED HEALTH CARE EDUCATION/TRAINING PROGRAM

## 2023-06-07 PROCEDURE — 72050 XR CERVICAL SPINE AP LAT WITH FLEX EXTEN: ICD-10-PCS | Mod: 26,,, | Performed by: RADIOLOGY

## 2023-06-07 PROCEDURE — 99215 OFFICE O/P EST HI 40 MIN: CPT | Mod: S$GLB,,, | Performed by: STUDENT IN AN ORGANIZED HEALTH CARE EDUCATION/TRAINING PROGRAM

## 2023-06-07 PROCEDURE — 1159F PR MEDICATION LIST DOCUMENTED IN MEDICAL RECORD: ICD-10-PCS | Mod: CPTII,S$GLB,, | Performed by: STUDENT IN AN ORGANIZED HEALTH CARE EDUCATION/TRAINING PROGRAM

## 2023-06-07 PROCEDURE — 3008F PR BODY MASS INDEX (BMI) DOCUMENTED: ICD-10-PCS | Mod: CPTII,S$GLB,, | Performed by: STUDENT IN AN ORGANIZED HEALTH CARE EDUCATION/TRAINING PROGRAM

## 2023-06-07 PROCEDURE — 3079F PR MOST RECENT DIASTOLIC BLOOD PRESSURE 80-89 MM HG: ICD-10-PCS | Mod: CPTII,S$GLB,, | Performed by: STUDENT IN AN ORGANIZED HEALTH CARE EDUCATION/TRAINING PROGRAM

## 2023-06-07 PROCEDURE — 3008F BODY MASS INDEX DOCD: CPT | Mod: CPTII,S$GLB,, | Performed by: STUDENT IN AN ORGANIZED HEALTH CARE EDUCATION/TRAINING PROGRAM

## 2023-06-07 PROCEDURE — 72050 X-RAY EXAM NECK SPINE 4/5VWS: CPT | Mod: 26,,, | Performed by: RADIOLOGY

## 2023-06-07 PROCEDURE — 3077F PR MOST RECENT SYSTOLIC BLOOD PRESSURE >= 140 MM HG: ICD-10-PCS | Mod: CPTII,S$GLB,, | Performed by: STUDENT IN AN ORGANIZED HEALTH CARE EDUCATION/TRAINING PROGRAM

## 2023-06-07 PROCEDURE — 3079F DIAST BP 80-89 MM HG: CPT | Mod: CPTII,S$GLB,, | Performed by: STUDENT IN AN ORGANIZED HEALTH CARE EDUCATION/TRAINING PROGRAM

## 2023-06-07 PROCEDURE — 3077F SYST BP >= 140 MM HG: CPT | Mod: CPTII,S$GLB,, | Performed by: STUDENT IN AN ORGANIZED HEALTH CARE EDUCATION/TRAINING PROGRAM

## 2023-06-07 PROCEDURE — 72050 X-RAY EXAM NECK SPINE 4/5VWS: CPT | Mod: TC,FY,PO

## 2023-06-07 NOTE — PROGRESS NOTES
[facility] Neurosurgery - [facility]  Clinic Consult     Consult Requested By: Ronald Bernabe MD, Negrito Stringer *        SUBJECTIVE:     Chief Complaint:   Chief Complaint   Patient presents with    Cervical Spine Pain (C-spine)     Patient reports to clinic with c/o neck pain, R>L, x years, with numbness and tingling sometimes into the R arm, thumb and 1st finger. Neck pain radiates into the middle of the shoulder blades.   Headaches 3-4 x per week.  Blurry vision, comes and goes.         History of Present Illness:  Li Singh is a 52 y.o. female who presents with     Pain in neck and traps, both UE greater in right  Has done pain mg strategies with some relief  PT temporary, works with Chiropractor  Ongoing for a decade      New imaging stable  Reversal of lordosis  C5-6 6-7 DDD, loss of ht , foraminal stenosis    No cord compression or csc    Alignment normal of dynamic imaging            Diagnostic Results:  I have independently reviewed the following imaging:     Impression:     1. Redemonstrated smooth cervical kyphosis and superimposed degenerative changes most pronounced at C4-C5 and C5-C6 but no disc herniation, acute process, or significant change from MRI 07/27/2022.  2. Foraminal narrowing is again most pronounced on the left at C4-C5 and C5-C6.        Electronically signed by: Ian Montalvo  Date:                                            05/30/2023  Time:                                           15:02        Review of patient's allergies indicates:  No Known Allergies    Past Medical History:   Diagnosis Date    Anxiety     Gastric ulcer     Hyperlipidemia     Hypertension     Sleep apnea     Thyroid disease      Past Surgical History:   Procedure Laterality Date    COLONOSCOPY      EPIDURAL STEROID INJECTION INTO CERVICAL SPINE N/A 09/22/2022    Procedure: Injection-steroid-epidural-cervical;  Surgeon: Ronald Bernabe MD;  Location: [facility] OR;  Service: Pain  Management;  Laterality: N/A;    EPIDURAL STEROID INJECTION INTO CERVICAL SPINE N/A 12/30/2022    Procedure: Injection-steroid-epidural-cervical to right;  Surgeon: Ronald Bernabe MD;  Location: [facility] OR;  Service: Pain Management;  Laterality: N/A;    EPIDURAL STEROID INJECTION INTO CERVICAL SPINE N/A 4/6/2023    Procedure: Injection-steroid-epidural-cervical C7/t1  (to the right);  Surgeon: Ronald Bernabe MD;  Location: [facility] OR;  Service: Pain Management;  Laterality: N/A;  (pt would like am please)    EPIDURAL STEROID INJECTION INTO LUMBAR SPINE N/A 12/01/2022    Procedure: Injection-steroid-epidural-lumbar L5/S1;  Surgeon: Ronald Bernabe MD;  Location: [facility] OR;  Service: Pain Management;  Laterality: N/A;    INJECTION OF ANESTHETIC AGENT AROUND MEDIAL BRANCH NERVES INNERVATING CERVICAL FACET JOINT Bilateral 10/28/2022    Procedure: Block-nerve-medial branch-cervical C4/5 and C5/6;  Surgeon: Ronald Bernabe MD;  Location: [facility] OR;  Service: Pain Management;  Laterality: Bilateral;     Family History   Problem Relation Age of Onset    Breast cancer Mother 62     Social History     Tobacco Use    Smoking status: Never    Smokeless tobacco: Never   Substance Use Topics    Alcohol use: Not Currently    Drug use: Not Currently        Review of Systems:      Constitutional: no fever, chills or night sweats. No abrupt changes in weight   Eyes: no diplopia, lid drooping, loss of vision   ENT: no nasal congestion, sore throat, discharge  Respiratory: no cough, shortness of breath, or pain  Cardiovascular: no chest pain or palpitations   Gastrointestinal: no nausea or vomiting  Genitourinary: no hematuria or dysuria   Integument/Breast: no rash or pruritis   Hematologic/Lymphatic: no easy bruising or lymphadenopathy   Neurological: no seizures or tremors   Behavioral/Psych: no auditory or visual hallucinations   All other systems reviewed and are negative.      OBJECTIVE:     Vital Signs (Most  Recent):  Pulse: 65 (06/07/23 0956)  Resp: 18 (06/07/23 0956)  BP: (!) 140/87 (06/07/23 0956)    Physical Exam:      General: well developed, well nourished, no distress. .  Mental Status: Awake, Alert, Oriented x 4  Language: No aphasia  Speech: No dysarthria  Head: normocephalic, atraumatic.  Neck: trachea midline, no JVD   Cardiovascular: no LE edema  Pulmonary: normal respirations, no signs of respiratory distress  Abdomen: soft, non-distended    Motor Strength:  No abnormal movements seen.     Strength  Deltoids Triceps Biceps Wrist Extension Wrist Flexion Hand  Interossei     Upper: R 5/5 5/5 5/5 5/5 5/5 5/5 5/5      L 5/5 5/5 5/5 5/5 5/5 5/5 5/5       Iliopsoas Quadriceps Knee  Flexion Tibialis  anterior Gastro- cnemius EHL  Foot Eversion Foot inversion   Lower: R 5/5 5/5 5/5 5/5 5/5 5/5 5/5 5/5 5/5    L 5/5 5/5 5/5 5/5 5/5 5/5 5/5 5/5 5/5     SILT,PP dec both fingers right first 3 digits    Swollen 1rst digit  +spurling    + right tinnel      DTR's: 1 + and symmetric in UE and LE  Lucio: absent  Clonus: absent      Gait: normal                 ASSESSMENT/PLAN:     Cervical spinal stenosis    DDD (degenerative disc disease), cervical  -     Ambulatory referral/consult to Neurosurgery    Cervical disc disorder with radiculopathy    Cubital tunnel syndrome on right    Osteoporosis with current pathological fracture with nonunion, unspecified osteoporosis type, subsequent encounter        51 yo chronic progressive neck pain and radiculopathy  Intact  Severe limitations with ADLs and QOL  Tries PT, chiro, pain mg injections  Reviewed in detail surgery would require C5-7 ACDF    She does not want surgery now risk/frida ect      RF include    + tinel r/u CTS/cubital > EMG/NCS    DEXA with osteopenia, however low velocity fractures (finger recent ) only grabbing grandchild  C/w Osteoporosis, also obvisous demineralization on images and xrays  - only 51 rec BMD eval potential anabolic or max recs : endocrine bone  mineral density eval vs pcp    She will and f/u prn or new question'/concern    The diagnosis, goals, limitations, risks and benefits of surgery and alternative treatment options where discussed at length (pros/cons). All questions/concerns were addressed. The patient has verbalized a good understanding of the diagnosis, the potential procedure, anticipated post-operative course, overall expectations, and risks including but not limited to:  Dysphagia, dysphonia, Cervical palsy, nerve root injury/paralysis, death, nerve injury leading to pain or neurological deficit, csf leak, vascular injury or serious bleeding/need for blood product transfusion/stroke, wrong level surgery, hardware/instrumenteation misplacement, chronic pain/failure to improve or worsening of symptoms, infection, pseudoarthrosis, hardware failure, need for further surgery at the same or different levels; medical (eg Heart attack, blood clot, infection) and anesthetic complications.         Evelio Law MD  Neurosurgery

## 2023-06-08 ENCOUNTER — TELEPHONE (OUTPATIENT)
Dept: NEUROSURGERY | Facility: CLINIC | Age: 53
End: 2023-06-08
Payer: COMMERCIAL

## 2023-06-08 NOTE — TELEPHONE ENCOUNTER
----- Message from Tre Fraser, Patient Care Assistant sent at 6/8/2023  8:29 AM CDT -----  Contact: Pt  Type: Needs Medical Advice    Who Called: Pt  Best Call Back Number: [phone]  Inquiry/Question: Pt is calling to see why she was referred to endocrinology. Please Advise Thank you~

## 2023-07-06 RX ORDER — KETOROLAC TROMETHAMINE 10 MG/1
10 TABLET, FILM COATED ORAL DAILY PRN
Qty: 5 TABLET | Refills: 0 | Status: SHIPPED | OUTPATIENT
Start: 2023-07-06 | End: 2023-09-12

## 2023-07-27 ENCOUNTER — OFFICE VISIT (OUTPATIENT)
Dept: PAIN MEDICINE | Facility: CLINIC | Age: 53
End: 2023-07-27
Payer: COMMERCIAL

## 2023-07-27 VITALS
HEART RATE: 54 BPM | DIASTOLIC BLOOD PRESSURE: 75 MMHG | WEIGHT: 185.94 LBS | SYSTOLIC BLOOD PRESSURE: 141 MMHG | HEIGHT: 66 IN | BODY MASS INDEX: 29.88 KG/M2

## 2023-07-27 DIAGNOSIS — M50.30 DDD (DEGENERATIVE DISC DISEASE), CERVICAL: ICD-10-CM

## 2023-07-27 DIAGNOSIS — M47.816 LUMBAR SPONDYLOSIS: Primary | ICD-10-CM

## 2023-07-27 DIAGNOSIS — M54.12 CERVICAL RADICULOPATHY: ICD-10-CM

## 2023-07-27 PROCEDURE — 3077F PR MOST RECENT SYSTOLIC BLOOD PRESSURE >= 140 MM HG: ICD-10-PCS | Mod: CPTII,S$GLB,, | Performed by: ANESTHESIOLOGY

## 2023-07-27 PROCEDURE — 3008F BODY MASS INDEX DOCD: CPT | Mod: CPTII,S$GLB,, | Performed by: ANESTHESIOLOGY

## 2023-07-27 PROCEDURE — 3078F DIAST BP <80 MM HG: CPT | Mod: CPTII,S$GLB,, | Performed by: ANESTHESIOLOGY

## 2023-07-27 PROCEDURE — 99214 PR OFFICE/OUTPT VISIT, EST, LEVL IV, 30-39 MIN: ICD-10-PCS | Mod: S$GLB,,, | Performed by: ANESTHESIOLOGY

## 2023-07-27 PROCEDURE — 99999 PR PBB SHADOW E&M-EST. PATIENT-LVL V: CPT | Mod: PBBFAC,,, | Performed by: ANESTHESIOLOGY

## 2023-07-27 PROCEDURE — 1159F MED LIST DOCD IN RCRD: CPT | Mod: CPTII,S$GLB,, | Performed by: ANESTHESIOLOGY

## 2023-07-27 PROCEDURE — 99214 OFFICE O/P EST MOD 30 MIN: CPT | Mod: S$GLB,,, | Performed by: ANESTHESIOLOGY

## 2023-07-27 PROCEDURE — 3078F PR MOST RECENT DIASTOLIC BLOOD PRESSURE < 80 MM HG: ICD-10-PCS | Mod: CPTII,S$GLB,, | Performed by: ANESTHESIOLOGY

## 2023-07-27 PROCEDURE — 99999 PR PBB SHADOW E&M-EST. PATIENT-LVL V: ICD-10-PCS | Mod: PBBFAC,,, | Performed by: ANESTHESIOLOGY

## 2023-07-27 PROCEDURE — 3077F SYST BP >= 140 MM HG: CPT | Mod: CPTII,S$GLB,, | Performed by: ANESTHESIOLOGY

## 2023-07-27 PROCEDURE — 1159F PR MEDICATION LIST DOCUMENTED IN MEDICAL RECORD: ICD-10-PCS | Mod: CPTII,S$GLB,, | Performed by: ANESTHESIOLOGY

## 2023-07-27 PROCEDURE — 3008F PR BODY MASS INDEX (BMI) DOCUMENTED: ICD-10-PCS | Mod: CPTII,S$GLB,, | Performed by: ANESTHESIOLOGY

## 2023-07-27 NOTE — PROGRESS NOTES
This note was completed with dictation software and grammatical errors may exist.    Chief Complaint   Patient presents with    Neck Pain    Back Pain    Low-back Pain    Hip Problem        HPI: Li Singh is a 52 y.o. year old female patient who has a past medical history of Anxiety, Gastric ulcer, Hyperlipidemia, Hypertension, Sleep apnea, and Thyroid disease. She presents in referral from No ref. provider found for neck pain.  She continues to have neck pain radiating into her shoulder blades, worse with turning her head side to side, did not have relief after several epidural steroid injections and medial branch blocks.  She was seen by Neurosurgery, Dr. Law and although surgery could be considered, he feels that her bone density may be a risk factor.  She currently has fracture in her finger that did not heal and she has a DEXA scan that shows osteopenia.  She currently has an appointment scheduled with endocrinology.  While she has continued to have neck pain this has not worsened but she does feel that her back pain is worsened, feels it in the bilateral hips.  She feels that there is a clicking on the left side, can be painful, denies any pain radiating down the legs however.  She has pain worse with moving from sitting to standing, worse with standing for long time.            Previous history:  The patient reports that she has had neck pain for about the last 10 years, denies any specific trauma that may have caused this.  Is located in her bilateral neck at the base of her neck out into the bilateral  numbness and tingling in the right arm especially when tilting her head to the left and gets numbness ,  and tingling in the 1st through 3rd fingers.  The pain also radiates into the trapezius sometimes into the clavicle and down the middle of her spine through the shoulder blades.   She reports that at time she gets headaches in the occipital region that advanced into the front of her head at times as  well.   She denies any balance issues, denies any regan weakness.    Pain intervention history:  She apparently has undergone injections in her neck with no relief. She is status post C7-T1 interlaminar epidural steroid injection on 09/22/2022 with 30% relief, reports that falling and using the tractor after the injection may have affected the results. She is status post bilateral C4/5 and C5/6 diagnostic medial branch nerve blocks on 10/28/2022 with 0% relief. She is status post L5/S1 interlaminar epidural steroid injection on 12/01/2022 with greater than 60% relief.  She is status post C7-T1 interlaminar epidural steroid injection on 12/30/2022 with excellent relief lasting 3-4 weeks, now reporting mild relief.  She is status post C7-T1 interlaminar epidural steroid injection on 04/06/2023 with 4 weeks of moderate relief.     Spine surgeries:  The patient states that she had seen Dr. Al in the past who had recommended surgery    Antineuropathics:  Gabapentin 600 3 times daily  NSAIDs:  Physical therapy:  She is done physical therapy in the past, chiropractic care with some benefit.  She states that she was seeing a chiropractor at least once a week but was not able to do this for 3 months and states that her pain greatly worsened during that time.  Antidepressants:  BuSpar 5 mg, Wellbutrin 150 mg  Muscle relaxers:  Xanax 1 mg, Soma  Opioids:  Antiplatelets/Anticoagulants:  Aspirin 81    ROS:  She reports headaches, stomach ulcer, easy bruising, joint stiffness, back pain, memory loss, dizziness, difficulty sleeping.  Balance of review of systems negative.    No results found for: LABA1C, HGBA1C    No results found for: WBC, HGB, HCT, MCV, PLT    Past Medical History:   Diagnosis Date    Anxiety     Gastric ulcer     Hyperlipidemia     Hypertension     Sleep apnea     Thyroid disease        Past Surgical History:   Procedure Laterality Date    COLONOSCOPY      EPIDURAL STEROID INJECTION INTO CERVICAL SPINE N/A  "09/22/2022    Procedure: Injection-steroid-epidural-cervical;  Surgeon: Ronald Bernabe MD;  Location: [facility] OR;  Service: Pain Management;  Laterality: N/A;    EPIDURAL STEROID INJECTION INTO CERVICAL SPINE N/A 12/30/2022    Procedure: Injection-steroid-epidural-cervical to right;  Surgeon: Ronald Bernabe MD;  Location: [facility] OR;  Service: Pain Management;  Laterality: N/A;    EPIDURAL STEROID INJECTION INTO CERVICAL SPINE N/A 4/6/2023    Procedure: Injection-steroid-epidural-cervical C7/t1  (to the right);  Surgeon: Ronald Bernabe MD;  Location: [facility] OR;  Service: Pain Management;  Laterality: N/A;  (pt would like am please)    EPIDURAL STEROID INJECTION INTO LUMBAR SPINE N/A 12/01/2022    Procedure: Injection-steroid-epidural-lumbar L5/S1;  Surgeon: Ronald Bernabe MD;  Location: [facility] OR;  Service: Pain Management;  Laterality: N/A;    INJECTION OF ANESTHETIC AGENT AROUND MEDIAL BRANCH NERVES INNERVATING CERVICAL FACET JOINT Bilateral 10/28/2022    Procedure: Block-nerve-medial branch-cervical C4/5 and C5/6;  Surgeon: Ronald Bernabe MD;  Location: [facility] OR;  Service: Pain Management;  Laterality: Bilateral;       Social History     Socioeconomic History    Marital status:    Tobacco Use    Smoking status: Never    Smokeless tobacco: Never   Substance and Sexual Activity    Alcohol use: Not Currently    Drug use: Not Currently         Medications/Allergies: See med card    Vitals:    07/27/23 1019   BP: (!) 141/75   Pulse: (!) 54   Weight: 84.4 kg (185 lb 15.3 oz)   Height: 5' 6" (1.676 m)   PainSc:   6   PainLoc: Back     Body mass index is 30.01 kg/m².    Physical exam:  Gen: A and O x3, pleasant, well-groomed  Skin: No rashes or obvious lesions  HEENT: PERRLA, no obvious deformities on ears or in canals.Trachea midline.  CVS: Regular rate and rhythm, normal palpable pulses.  Resp: Clear to auscultation bilaterally, no wheezes or rales.  Abdomen: Soft, NT/ND.  Musculoskeletal: Able to " heel walk, toe walk. No antalgic gait.     Neuro:  Motor:    Right Left   C4 Shoulder Abduction  5  5   C5 Elbow Flexion    5  5   C6 Wrist Extension  5  5   C7 Elbow Extension   5  5   C8/T1 Hand Intrinsics   5  5   C8 First Dorsal Interosseus  5  5   C8 Abductor Pollicus Brevis  5  5       Iliopsoas Quadriceps Knee  Flexion Tibialis  anterior Gastro- cnemius EHL   Lower: R /5 5/ 5/ 5/ 5/ 5/    L / 5        Left  Right    Triceps DTR 2+ 2+   Biceps DTR 2+ 2+   Brachioradialis DTR 2+ 2+   Patellar DTR 1+ 1+   Achilles DTR 1+ 1+   Lucio Absent  Absent   Clonus Absent Absent          Sensory: Intact and symmetrical to light touch and pinprick in C2-T1 dermatomes bilaterally. Intact and symmetrical to light touch and pinprick in L1-S1 dermatomes bilaterally.    Cervical spine: ROM is full in flexion, extension and lateral rotation with increased pain on extension greater than flexion, lateral rotation increased pain to the left greater than right  Spurling's maneuver causes neck pain to either side, left greater than right..  Myofascial exam: No Tenderness to palpation across cervical paraspinous region bilaterally.    Lumbar spine: ROM is full with flexion extension and oblique extension with increased pain on extension to either side.    Talib's test causes no increased pain on either side.    Supine straight leg raise is negative bilaterally.    Internal and external rotation of the hip causes no increased pain on either side.  Myofascial exam: No tenderness to palpation across lumbar paraspinous muscles.    Imagin22 MRI C-spine:  Alignment: Reversal of the normal cervical lordosis centered at C4.  Minimal anterolisthesis of C3 on C4, 2 mm.  Minimal retrolisthesis of C4 on C5 and C5 on C6, 1-2 mm.   Vertebral Column: Vertebral body heights are maintained.  No evidence of an acute fracture or aggressive marrow replacement process. Probable small hemangiomas within the C4 C7  vertebral bodies.  Moderate disc degeneration at C4-5 and C5-6 with moderate intervertebral disc space narrowing, degenerative endplate change and marginal osteophyte formation.   Cord: Mild flattening of the left ventral aspect of the cord at C4-5.  No focal cord signal abnormality.   Skull base and craniocervical junction: Normal.   Degenerative findings:   C2-C3: The disc is normal in configuration. Mild left facet arthropathy.  There is no neural foraminal stenosis.  There is no spinal canal stenosis.   C3-C4: Mild posterior disc osteophyte complex, which mildly effaces the ventral thecal sac.  Mild left facet arthropathy.  Mild left neural foraminal stenosis.  There is no spinal canal stenosis.   C4-C5: Left asymmetric posterior disc osteophyte complex, which mildly effaces the ventral thecal sac and flattens the left ventral aspect of the cord without significant cord compression or focal cord signal abnormality.  Mild bilateral facet arthropathy.  Moderate left uncovertebral joint spurring.  Moderate left neural foraminal stenosis.  There is no spinal canal stenosis.   C5-C6: Posterior disc osteophyte complex.  Mild bilateral facet arthropathy.  Marked right and moderate left uncovertebral joint spurring.  Severe right and moderate left neural foraminal stenosis.  Ligamentum flavum thickening.  Mild spinal canal stenosis.   C6-C7: Mild posterior disc osteophyte complex.  Mild bilateral facet arthropathy.  Mild left uncovertebral joint spurring.  Mild left neural foraminal stenosis.  There is no spinal canal stenosis.   C7-T1: The disc is normal in configuration.  Mild bilateral facet arthropathy and uncovertebral joint spurring.  Mild bilateral neural foraminal stenosis.  There is no spinal canal stenosis.    10/31/2022 MRI lumbar spine   Alignment: Mild dextroconvex curvature of lower lumbar spine and levoconvex curvature of the thoracolumbar junction.  Mild grade 1 anterolisthesis of L4 on L5 and L5 on  S1.     Vertebral column: Vertebral body heights are maintained.  No evidence of an acute fracture or aggressive marrow replacement process. Marked right asymmetric disc space narrowing at T11-12 with mixed degenerative endplate change and marginal osteophyte formation.  Multilevel disc degeneration throughout the lumbar spine with mild-to-moderate disc space narrowing and disc bulges, most pronounced at L1-2, L3-4 and L5-S1.     Cord: Normal.  Conus terminates at L1.     Degenerative findings:     T11-12: Right asymmetric diffuse disc bulge with osteophytic ridging.  Mild bilateral facet arthropathy and ligamentum flavum thickening.  Moderate right neural foraminal stenosis.  Mild spinal canal stenosis.   T12-L1: No significant disc abnormality.  Mild bilateral facet arthropathy ligamentum flavum thickening.  No neural foraminal or spinal canal stenosis.  L1-L2: Diffuse disc bulge with osteophytic ridging.  Mild bilateral facet arthropathy ligamentum flavum thickening.  Mild bilateral neural foraminal stenosis.  There is no spinal canal stenosis.   L2-L3: Mild diffuse disc bulge with osteophytic ridging.  Mild bilateral facet arthropathy ligamentum flavum thickening.  Mild left neural foraminal stenosis.  There is no spinal canal stenosis.   L3-L4: Diffuse disc bulge with osteophytic ridging.  Moderate bilateral facet arthropathy.  Ligamentum flavum thickening.  Mild-to-moderate left and mild right neural foraminal stenosis.  Mild spinal canal stenosis.   L4-L5: Mild diffuse disc bulge.  Marked left greater than right facet arthropathy with left-sided facet effusion.  Ligamentum flavum thickening.  Mild bilateral neural foraminal stenosis.  Mild spinal canal stenosis.   L5-S1: Diffuse disc bulge with osteophytic ridging and superimposed right central/subarticular disc protrusion through an annular fissure.  Marked right greater than left facet arthropathy with facet effusions.  Mild-to-moderate right and mild left  neural foraminal stenosis.  Mild spinal canal stenosis.   Paraspinal muscles & soft tissues: No significant abnormalities.      Assessment:   Li Singh is a 52 y.o. year old female patient who has a past medical history of Anxiety, Gastric ulcer, Hyperlipidemia, and Hypertension. She presents in referral from Shikha Steen for neck pain.     1. Lumbar spondylosis  Ambulatory referral/consult to Physical/Occupational Therapy      2. Cervical radiculopathy  Ambulatory referral/consult to Physical/Occupational Therapy      3. DDD (degenerative disc disease), cervical  Ambulatory referral/consult to Physical/Occupational Therapy            Plan:  1. We discussed that her back pain is most likely secondary to facet joint arthritis which is significant on the left at L4/5 and on the right at L5/S1, we reviewed her symptoms, reviewed her MRI.  We could always consider medial branch blocks and radiofrequency ablation but for now, I would like her to get set up with physical therapy for this to work on core strengthening.  Also work on physical therapy exercises for her cervical spine pain.  In the meantime she will see endocrinology and hopefully have options for treating her osteopenia.  If she should need surgery in the future, she will need to make sure that her bone density is adequate.    2. I will have her follow up in 2-3 months or sooner as needed.

## 2023-08-30 ENCOUNTER — OFFICE VISIT (OUTPATIENT)
Dept: PHYSICAL MEDICINE AND REHAB | Facility: CLINIC | Age: 53
End: 2023-08-30
Payer: COMMERCIAL

## 2023-08-30 VITALS — HEIGHT: 66 IN | BODY MASS INDEX: 29.9 KG/M2 | WEIGHT: 186.06 LBS

## 2023-08-30 DIAGNOSIS — G56.03 BILATERAL CARPAL TUNNEL SYNDROME: Primary | ICD-10-CM

## 2023-08-30 DIAGNOSIS — M50.30 DDD (DEGENERATIVE DISC DISEASE), CERVICAL: ICD-10-CM

## 2023-08-30 PROCEDURE — 95911 PR NERVE CONDUCTION STUDY; 9-10 STUDIES: ICD-10-PCS | Mod: S$GLB,,, | Performed by: PHYSICAL MEDICINE & REHABILITATION

## 2023-08-30 PROCEDURE — 99499 UNLISTED E&M SERVICE: CPT | Mod: S$GLB,,, | Performed by: PHYSICAL MEDICINE & REHABILITATION

## 2023-08-30 PROCEDURE — 95886 MUSC TEST DONE W/N TEST COMP: CPT | Mod: S$GLB,,, | Performed by: PHYSICAL MEDICINE & REHABILITATION

## 2023-08-30 PROCEDURE — 99499 NO LOS: ICD-10-PCS | Mod: S$GLB,,, | Performed by: PHYSICAL MEDICINE & REHABILITATION

## 2023-08-30 PROCEDURE — 99999 PR PBB SHADOW E&M-EST. PATIENT-LVL II: ICD-10-PCS | Mod: PBBFAC,,, | Performed by: PHYSICAL MEDICINE & REHABILITATION

## 2023-08-30 PROCEDURE — 99999 PR PBB SHADOW E&M-EST. PATIENT-LVL II: CPT | Mod: PBBFAC,,, | Performed by: PHYSICAL MEDICINE & REHABILITATION

## 2023-08-30 PROCEDURE — 95886 PR EMG COMPLETE, W/ NERVE CONDUCTION STUDIES, 5+ MUSCLES: ICD-10-PCS | Mod: S$GLB,,, | Performed by: PHYSICAL MEDICINE & REHABILITATION

## 2023-08-30 PROCEDURE — 95911 NRV CNDJ TEST 9-10 STUDIES: CPT | Mod: S$GLB,,, | Performed by: PHYSICAL MEDICINE & REHABILITATION

## 2023-08-30 NOTE — PROGRESS NOTES
[facility]  [address] [place] [address]             Full Name: Li Singh Gender: Female  Patient ID: [id number] YOB: 1970  History: Right arm greater than left arm numbness into hands. Chronic neck pain. NO hx of neck surgery.       Visit Date: 8/30/2023 8:31 AM  Age: 52 Years  Examining Physician: Jess Macias DO  Referring Physician: Evelio Law      Sensory NCS      Nerve / Sites Rec. Site Onset Lat Peak Lat NP Amp PP Amp Segments Distance Velocity     ms ms µV µV  cm m/s   L Median - Digit III (Antidromic)      Wrist Dig III 3.17 4.04 24.2 31.8 Wrist - Dig III 14 44   R Median - Digit III (Antidromic)      Wrist Dig III NR NR NR NR Wrist - Dig III 14 NR   L Ulnar - Digit V (Antidromic)      Wrist Dig V 2.67 3.50 23.1 38.5 Wrist - Dig V 14 53   R Ulnar - Digit V (Antidromic)      Wrist Dig V 2.71 3.52 16.3 28.2 Wrist - Dig V 14 52   L Radial - Anatomical snuff box (Forearm)      Forearm Wrist 1.88 2.40 16.6 35.3 Forearm - Wrist 10 53   R Radial - Anatomical snuff box (Forearm)      Forearm Wrist 2.10 2.81 15.4 11.0 Forearm - Wrist 10 48       Motor NCS      Nerve / Sites Muscle Latency Amplitude Amp % Duration Segments Distance Lat Diff Velocity     ms mV % ms  cm ms m/s   L Median - APB      Wrist APB 4.23 5.9 100 7.00 Wrist - APB 8        Elbow APB 7.92 5.2 87.2 7.44 Elbow - Wrist 20 3.69 54   R Median - APB      Wrist APB 4.73 8.4 100 8.17 Wrist - APB 8        Elbow APB 9.38 7.8 92.6 7.75 Elbow - Wrist 21 4.65 45   L Ulnar - ADM      Wrist ADM 3.29 11.6 100 6.44 Wrist - ADM 8        B.Elbow ADM 6.73 11.8 102 6.69 B.Elbow - Wrist 22 3.44 64      A.Elbow ADM 8.52 12.1 104 6.52 A.Elbow - B.Elbow 10 1.79 56   R Ulnar - ADM      Wrist ADM 3.38 10.2 100 6.56 Wrist - ADM 8        B.Elbow ADM 7.23 10.6 104 6.79 B.Elbow - Wrist 22 3.85 57      A.Elbow ADM 8.75 11.6 113 6.77 A.Elbow - B.Elbow 10 1.52 66       EMG Summary Table     Spontaneous MUAP Recruitment   Muscle IA Fib  PSW Fasc CRD Amp Dur. Poly Pattern   L. Deltoid N None None None None N N None N   L. Biceps brachii N None None None None N N None N   L. Triceps brachii N None None None None N N None N   L. Pronator teres N None None None None N N None N   L. Abductor pollicis brevis N None None None None N N None N   R. Deltoid N None None None None N N None N   R. Biceps brachii N None None None None N N None N   R. Triceps brachii N None None None None N N None N   R. Pronator teres N None None None None N N None N   R. Abductor pollicis brevis N None None None None N N None N       Summary    The motor conduction test was performed on 4 nerve(s). The results were normal in 3 nerve(s): L Median - APB, L Ulnar - ADM, R Ulnar - ADM. Results outside the specified normal range were found in 1 nerve(s), as follows:  In the R Median - APB study  the take off latency result was increased for Wrist stimulation  the take off velocity result was reduced for Elbow - Wrist segment    The sensory conduction test was performed on 6 nerve(s). The results were normal in 4 nerve(s): L Ulnar - Digit V (Antidromic), R Ulnar - Digit V (Antidromic), L Radial - Anatomical snuff box (Forearm), R Radial - Anatomical snuff box (Forearm). Results outside the specified normal range were found in 2 nerve(s), as follows:  In the L Median - Digit III (Antidromic) study  the peak latency result was increased for Wrist stimulation  In the R Median - Digit III (Antidromic) study  the response was considered absent for Wrist stimulation    The needle EMG study was normal in all 10 tested muscles: L. Deltoid, L. Biceps brachii, L. Triceps brachii, L. Pronator teres, L. Abductor pollicis brevis, R. Deltoid, R. Biceps brachii, R. Triceps brachii, R. Pronator teres, R. Abductor pollicis brevis.       Impression:  Abnormal study.   Moderate/severe right carpal tunnel syndrome, without active denervation.   Mild left carpal tunnel syndrome, without active  denervation.   No electrophysiologic evidence of bilateral cervical radiculopathy/plexopathy, bilateral ulnar mononeuropathy, or peripheral neuropathy in the upper extremities.    ------------------------------  Jess Macias, DO

## 2023-09-12 RX ORDER — KETOROLAC TROMETHAMINE 10 MG/1
10 TABLET, FILM COATED ORAL DAILY PRN
Qty: 5 TABLET | Refills: 0 | Status: SHIPPED | OUTPATIENT
Start: 2023-09-12 | End: 2023-11-09

## 2023-11-09 RX ORDER — KETOROLAC TROMETHAMINE 10 MG/1
10 TABLET, FILM COATED ORAL DAILY PRN
Qty: 5 TABLET | Refills: 0 | Status: SHIPPED | OUTPATIENT
Start: 2023-11-09

## (undated) DEVICE — MARKER SKIN STND TIP BLUE BARR

## (undated) DEVICE — NDL TUOHY EPIDURAL 20G X 3.5

## (undated) DEVICE — APPLICATOR CHLORAPREP CLR 10.5

## (undated) DEVICE — TRAY NERVE BLOCK

## (undated) DEVICE — GLOVE SURGICAL LATEX SZ 7

## (undated) DEVICE — NDL HYPO REG 25G X 1 1/2

## (undated) DEVICE — TOWEL OR DISP STRL BLUE 4/PK

## (undated) DEVICE — SYR GLASS 5CC LUER LOK